# Patient Record
Sex: MALE | Race: WHITE | Employment: OTHER | URBAN - METROPOLITAN AREA
[De-identification: names, ages, dates, MRNs, and addresses within clinical notes are randomized per-mention and may not be internally consistent; named-entity substitution may affect disease eponyms.]

---

## 2017-01-26 RX ORDER — LISINOPRIL 10 MG/1
TABLET ORAL
Qty: 30 TABLET | Refills: 2 | Status: SHIPPED | OUTPATIENT
Start: 2017-01-26 | End: 2017-11-14

## 2017-01-26 RX ORDER — HYDROCHLOROTHIAZIDE 25 MG/1
TABLET ORAL
Qty: 30 TABLET | Refills: 2 | Status: SHIPPED | OUTPATIENT
Start: 2017-01-26 | End: 2017-11-14

## 2017-11-14 RX ORDER — HYDROCHLOROTHIAZIDE 25 MG/1
TABLET ORAL
Qty: 90 TABLET | Refills: 0 | Status: SHIPPED | OUTPATIENT
Start: 2017-11-14 | End: 2018-11-02 | Stop reason: ALTCHOICE

## 2017-11-14 RX ORDER — LISINOPRIL 10 MG/1
TABLET ORAL
Qty: 90 TABLET | Refills: 0 | Status: SHIPPED | OUTPATIENT
Start: 2017-11-14 | End: 2018-11-02 | Stop reason: ALTCHOICE

## 2017-11-14 NOTE — TELEPHONE ENCOUNTER
No upcoming appt     HYDROCHLOROTHIAZIDE 25MG TABLETS  Will file in chart as: HYDROCHLOROTHIAZIDE 25 MG Oral Tab  TAKE 1 TABLET(25 MG) BY MOUTH EVERY MORNING       Disp: 30 tablet Refills: 0    Class: Normal Start: 11/14/2017   Originally ordered: 1 year ago by Zbigniew Zuniga MD  Last refill: 1/26/2017  Hypertension Medications Protocol [id number]/14 9:47 AM   CMP or BMP in past 12 months    Appointment in past 6 or next 3 months    Last serum creatinine< 2.0   Protocol Details  LISINOPRIL 10MG TABLETS  Will file in chart as: LISINOPRIL 10 MG Oral Tab  TAKE 1 TABLET(10 MG) BY MOUTH DAILY       Disp: 30 tablet Refills: 0    Class: Normal Start: 11/14/2017   Originally ordered: 1 year ago by Zbigniew Zuniga MD  Last refill: 1/26/2017  Hypertension Medications Protocol Jhwbuf47/14 9:47 AM   CMP or BMP in past 12 months    Appointment in past 6 or next 3 months    Last serum creatinine< 2.0

## 2017-11-20 ENCOUNTER — OFFICE VISIT (OUTPATIENT)
Dept: FAMILY MEDICINE CLINIC | Facility: CLINIC | Age: 52
End: 2017-11-20

## 2017-11-20 VITALS
TEMPERATURE: 98 F | SYSTOLIC BLOOD PRESSURE: 158 MMHG | HEIGHT: 73 IN | HEART RATE: 76 BPM | BODY MASS INDEX: 32.74 KG/M2 | RESPIRATION RATE: 16 BRPM | OXYGEN SATURATION: 99 % | WEIGHT: 247 LBS | DIASTOLIC BLOOD PRESSURE: 90 MMHG

## 2017-11-20 DIAGNOSIS — I10 HYPERTENSION, UNCONTROLLED: Primary | ICD-10-CM

## 2017-11-20 DIAGNOSIS — R53.83 FATIGUE, UNSPECIFIED TYPE: ICD-10-CM

## 2017-11-20 DIAGNOSIS — F17.210 HEAVY CIGARETTE SMOKER (20-39 PER DAY): ICD-10-CM

## 2017-11-20 DIAGNOSIS — R07.89 ATYPICAL CHEST PAIN: ICD-10-CM

## 2017-11-20 DIAGNOSIS — F17.200 TOBACCO DEPENDENCE: ICD-10-CM

## 2017-11-20 DIAGNOSIS — Z23 NEEDS FLU SHOT: ICD-10-CM

## 2017-11-20 DIAGNOSIS — Z71.6 ENCOUNTER FOR SMOKING CESSATION COUNSELING: ICD-10-CM

## 2017-11-20 PROCEDURE — 99214 OFFICE O/P EST MOD 30 MIN: CPT | Performed by: EMERGENCY MEDICINE

## 2017-11-20 RX ORDER — LISINOPRIL 10 MG/1
TABLET ORAL
Qty: 90 TABLET | Refills: 0 | Status: CANCELLED | OUTPATIENT
Start: 2017-11-20

## 2017-11-20 RX ORDER — HYDROCHLOROTHIAZIDE 25 MG/1
TABLET ORAL
Qty: 90 TABLET | Refills: 0 | Status: CANCELLED | OUTPATIENT
Start: 2017-11-20

## 2017-11-20 NOTE — PATIENT INSTRUCTIONS
Thank you for choosing [facility]  To Do:  [address] Geraldine Soto  1. Have blood tests done after fasting  2. Have EKG done at [address]  3. Have Chest Xray done   4. Arrange for stress test  5. Start chantix  6. Need to quit smoking  7.  Daily blood pre information is not intended as a substitute for professional medical care. Always follow your healthcare professional's instructions. The Benefits of Living Smoke Free  What do you want to gain from quitting? Check off some reasons to quit.   Health So are smokeless (chewing) tobacco and snuff. All of these products contain nicotine, a highly addictive substance that has harmful effects on your body. Quitting smoking means giving up all tobacco products.      For more information  · https://smokefree. regular tobacco. Tobacco should not be used as a substitute for cigarettes. · Herbal medicines or teas may affect how your body handles nicotine. Talk to your healthcare provider before using these products.   · E-cigarettes have less toxins than the smoke are all good reasons to quit. How smoking affects your body  Smoking has been linked with many serious illnesses. It also has been shown to increase signs of aging. A few of the health effects of smoking are listed below.  Smoking can:  · Increase your r

## 2017-11-20 NOTE — PROGRESS NOTES
Chief Complaint:   Patient presents with:  Medication Follow-Up    HPI:   This is a 45year old male     HYPERTENSION  Requests refill. On Lisinopril and HCTZ. Admits to skipping meds on occasion.  Took both meds this AM.Has not been doing blood pressure re (Oral)   Resp 16   Ht 73\"   Wt 247 lb   SpO2 99%   BMI 32.59 kg/m²  Estimated body mass index is 32.59 kg/m² as calculated from the following:    Height as of this encounter: 73\". Weight as of this encounter: 247 lb. Vital signs reviewed. Appears sta for smoking cessation counseling  -     Varenicline Tartrate 0.5 MG X 11 & 1 MG X 42 Oral Misc; Use as directed    Needs flu shot    Other orders  -     Cancel: hydrochlorothiazide 25 MG Oral Tab; TAKE 1 TABLET(25 MG) BY MOUTH EVERY MORNING  -     Cancel:

## 2018-02-01 ENCOUNTER — HOSPITAL ENCOUNTER (EMERGENCY)
Age: 53
Discharge: HOME | End: 2018-02-01
Attending: STUDENT IN AN ORGANIZED HEALTH CARE EDUCATION/TRAINING PROGRAM

## 2018-02-01 ENCOUNTER — APPOINTMENT (OUTPATIENT)
Dept: CT IMAGING | Age: 53
End: 2018-02-01
Attending: STUDENT IN AN ORGANIZED HEALTH CARE EDUCATION/TRAINING PROGRAM

## 2018-02-01 VITALS
SYSTOLIC BLOOD PRESSURE: 144 MMHG | WEIGHT: 240 LBS | TEMPERATURE: 98 F | BODY MASS INDEX: 33.6 KG/M2 | OXYGEN SATURATION: 99 % | HEIGHT: 71 IN | DIASTOLIC BLOOD PRESSURE: 73 MMHG | HEART RATE: 66 BPM | RESPIRATION RATE: 18 BRPM

## 2018-02-01 DIAGNOSIS — R11.2 NAUSEA AND VOMITING IN ADULT: ICD-10-CM

## 2018-02-01 DIAGNOSIS — K80.50 BILIARY COLIC: ICD-10-CM

## 2018-02-01 DIAGNOSIS — N28.1 CYST OF RIGHT KIDNEY: ICD-10-CM

## 2018-02-01 DIAGNOSIS — R10.9 ABDOMINAL PAIN, ACUTE: Primary | ICD-10-CM

## 2018-02-01 LAB
ALBUMIN SERPL-MCNC: 3.9 G/DL (ref 3.5–4.8)
ALP LIVER SERPL-CCNC: 86 U/L (ref 45–117)
ALT SERPL-CCNC: 47 U/L (ref 17–63)
AST SERPL-CCNC: 30 U/L (ref 15–41)
BASOPHILS # BLD AUTO: 0.04 X10(3) UL (ref 0–0.1)
BASOPHILS NFR BLD AUTO: 0.5 %
BILIRUB SERPL-MCNC: 0.6 MG/DL (ref 0.1–2)
BUN BLD-MCNC: 14 MG/DL (ref 8–20)
CALCIUM BLD-MCNC: 8.8 MG/DL (ref 8.3–10.3)
CHLORIDE: 104 MMOL/L (ref 101–111)
CO2: 27 MMOL/L (ref 22–32)
CREAT BLD-MCNC: 0.83 MG/DL (ref 0.7–1.3)
EOSINOPHIL # BLD AUTO: 0.41 X10(3) UL (ref 0–0.3)
EOSINOPHIL NFR BLD AUTO: 4.7 %
ERYTHROCYTE [DISTWIDTH] IN BLOOD BY AUTOMATED COUNT: 12.4 % (ref 11.5–16)
GLUCOSE BLD-MCNC: 127 MG/DL (ref 70–99)
HCT VFR BLD AUTO: 47.1 % (ref 37–53)
HGB BLD-MCNC: 16.5 G/DL (ref 13–17)
IMMATURE GRANULOCYTE COUNT: 0.03 X10(3) UL (ref 0–1)
IMMATURE GRANULOCYTE RATIO %: 0.3 %
LIPASE: 160 U/L (ref 73–393)
LYMPHOCYTES # BLD AUTO: 1.67 X10(3) UL (ref 0.9–4)
LYMPHOCYTES NFR BLD AUTO: 19.2 %
M PROTEIN MFR SERPL ELPH: 7.6 G/DL (ref 6.1–8.3)
MCH RBC QN AUTO: 31.1 PG (ref 27–33.2)
MCHC RBC AUTO-ENTMCNC: 35 G/DL (ref 31–37)
MCV RBC AUTO: 88.7 FL (ref 80–99)
MONOCYTES # BLD AUTO: 0.87 X10(3) UL (ref 0.1–0.6)
MONOCYTES NFR BLD AUTO: 10 %
NEUTROPHIL ABS PRELIM: 5.67 X10 (3) UL (ref 1.3–6.7)
NEUTROPHILS # BLD AUTO: 5.67 X10(3) UL (ref 1.3–6.7)
NEUTROPHILS NFR BLD AUTO: 65.3 %
PLATELET # BLD AUTO: 159 10(3)UL (ref 150–450)
POTASSIUM SERPL-SCNC: 3.5 MMOL/L (ref 3.6–5.1)
RBC # BLD AUTO: 5.31 X10(6)UL (ref 4.3–5.7)
RED CELL DISTRIBUTION WIDTH-SD: 40.6 FL (ref 35.1–46.3)
SODIUM SERPL-SCNC: 138 MMOL/L (ref 136–144)
WBC # BLD AUTO: 8.7 X10(3) UL (ref 4–13)

## 2018-02-01 PROCEDURE — 99284 EMERGENCY DEPT VISIT MOD MDM: CPT

## 2018-02-01 PROCEDURE — 85025 COMPLETE CBC W/AUTO DIFF WBC: CPT | Performed by: STUDENT IN AN ORGANIZED HEALTH CARE EDUCATION/TRAINING PROGRAM

## 2018-02-01 PROCEDURE — 74177 CT ABD & PELVIS W/CONTRAST: CPT | Performed by: STUDENT IN AN ORGANIZED HEALTH CARE EDUCATION/TRAINING PROGRAM

## 2018-02-01 PROCEDURE — 96376 TX/PRO/DX INJ SAME DRUG ADON: CPT

## 2018-02-01 PROCEDURE — 96375 TX/PRO/DX INJ NEW DRUG ADDON: CPT

## 2018-02-01 PROCEDURE — 96361 HYDRATE IV INFUSION ADD-ON: CPT

## 2018-02-01 PROCEDURE — 96372 THER/PROPH/DIAG INJ SC/IM: CPT

## 2018-02-01 PROCEDURE — 83690 ASSAY OF LIPASE: CPT | Performed by: STUDENT IN AN ORGANIZED HEALTH CARE EDUCATION/TRAINING PROGRAM

## 2018-02-01 PROCEDURE — 96374 THER/PROPH/DIAG INJ IV PUSH: CPT

## 2018-02-01 PROCEDURE — 80053 COMPREHEN METABOLIC PANEL: CPT | Performed by: STUDENT IN AN ORGANIZED HEALTH CARE EDUCATION/TRAINING PROGRAM

## 2018-02-01 RX ORDER — HYDROCODONE BITARTRATE AND ACETAMINOPHEN 5; 325 MG/1; MG/1
1-2 TABLET ORAL EVERY 4 HOURS PRN
Qty: 20 TABLET | Refills: 0 | Status: SHIPPED | OUTPATIENT
Start: 2018-02-01 | End: 2018-02-08

## 2018-02-01 RX ORDER — KETOROLAC TROMETHAMINE 30 MG/ML
INJECTION, SOLUTION INTRAMUSCULAR; INTRAVENOUS
Status: COMPLETED
Start: 2018-02-01 | End: 2018-02-01

## 2018-02-01 RX ORDER — DICYCLOMINE HYDROCHLORIDE 10 MG/ML
10 INJECTION INTRAMUSCULAR ONCE
Status: COMPLETED | OUTPATIENT
Start: 2018-02-01 | End: 2018-02-01

## 2018-02-01 RX ORDER — ONDANSETRON 2 MG/ML
4 INJECTION INTRAMUSCULAR; INTRAVENOUS ONCE
Status: COMPLETED | OUTPATIENT
Start: 2018-02-01 | End: 2018-02-01

## 2018-02-01 RX ORDER — ONDANSETRON 4 MG/1
4 TABLET, ORALLY DISINTEGRATING ORAL EVERY 4 HOURS PRN
Qty: 10 TABLET | Refills: 0 | Status: SHIPPED | OUTPATIENT
Start: 2018-02-01 | End: 2018-02-08

## 2018-02-01 RX ORDER — KETOROLAC TROMETHAMINE 30 MG/ML
30 INJECTION, SOLUTION INTRAMUSCULAR; INTRAVENOUS ONCE
Status: DISCONTINUED | OUTPATIENT
Start: 2018-02-01 | End: 2018-02-01

## 2018-02-01 RX ORDER — DICYCLOMINE HCL 20 MG
20 TABLET ORAL 4 TIMES DAILY PRN
Qty: 30 TABLET | Refills: 0 | Status: SHIPPED | OUTPATIENT
Start: 2018-02-01 | End: 2018-03-03

## 2018-02-01 RX ORDER — HYDROMORPHONE HYDROCHLORIDE 1 MG/ML
0.5 INJECTION, SOLUTION INTRAMUSCULAR; INTRAVENOUS; SUBCUTANEOUS EVERY 5 MIN PRN
Status: DISCONTINUED | OUTPATIENT
Start: 2018-02-01 | End: 2018-02-01

## 2018-02-01 NOTE — ED PROVIDER NOTES
Patient Seen in: [facility] In [place]    History   Patient presents with:  Nausea/Vomiting/Diarrhea (gastrointestinal)    Stated Complaint: abdomen pain since 9 pm last night nv    HPI    Patient is a 62-year-old male who presents th normal heart sounds and intact distal pulses. Exam reveals no gallop and no friction rub. No murmur heard. Pulmonary/Chest: Effort normal. No respiratory distress. no wheezes. no rales. no tenderness. Abdominal: Soft.  Bowel sounds are normal, no di received Toradol and Bentyl. He is improved and would like to go home. Vomiting resolved with antiemetics. He also received IV fluids. CT abdomen pelvis was obtained. Findings consistent with gallstone were noted. No evidence of cholecystitis.   On

## 2018-11-01 ENCOUNTER — TELEPHONE (OUTPATIENT)
Dept: FAMILY MEDICINE CLINIC | Facility: CLINIC | Age: 53
End: 2018-11-01

## 2018-11-01 NOTE — TELEPHONE ENCOUNTER
Left patient a message. He has not been in the office since 2017 and his BP was high.  If he is still coming to this office he is going to need to schedule a office visit if he is not seeing this office then we need to know who his new PCP is so we can take

## 2018-11-02 ENCOUNTER — OFFICE VISIT (OUTPATIENT)
Dept: FAMILY MEDICINE CLINIC | Facility: CLINIC | Age: 53
End: 2018-11-02

## 2018-11-02 VITALS
BODY MASS INDEX: 30.88 KG/M2 | WEIGHT: 233 LBS | TEMPERATURE: 98 F | OXYGEN SATURATION: 98 % | RESPIRATION RATE: 16 BRPM | HEART RATE: 87 BPM | SYSTOLIC BLOOD PRESSURE: 130 MMHG | DIASTOLIC BLOOD PRESSURE: 80 MMHG | HEIGHT: 73 IN

## 2018-11-02 DIAGNOSIS — R13.10 DYSPHAGIA, UNSPECIFIED TYPE: ICD-10-CM

## 2018-11-02 DIAGNOSIS — R16.1 SPLENOMEGALY: ICD-10-CM

## 2018-11-02 DIAGNOSIS — N28.1 KIDNEY CYSTS: ICD-10-CM

## 2018-11-02 DIAGNOSIS — M54.50 ACUTE LEFT-SIDED LOW BACK PAIN WITHOUT SCIATICA: Primary | ICD-10-CM

## 2018-11-02 DIAGNOSIS — K80.20 CHOLELITHIASIS WITHOUT CHOLECYSTITIS: ICD-10-CM

## 2018-11-02 PROCEDURE — 81003 URINALYSIS AUTO W/O SCOPE: CPT | Performed by: NURSE PRACTITIONER

## 2018-11-02 PROCEDURE — 99213 OFFICE O/P EST LOW 20 MIN: CPT | Performed by: NURSE PRACTITIONER

## 2018-11-02 PROCEDURE — 96372 THER/PROPH/DIAG INJ SC/IM: CPT | Performed by: NURSE PRACTITIONER

## 2018-11-02 RX ORDER — DICLOFENAC SODIUM 75 MG/1
75 TABLET, DELAYED RELEASE ORAL 2 TIMES DAILY
Qty: 60 TABLET | Refills: 0 | Status: SHIPPED | OUTPATIENT
Start: 2018-11-02 | End: 2018-12-02

## 2018-11-02 RX ORDER — METHYLPREDNISOLONE 4 MG/1
TABLET ORAL
Qty: 1 KIT | Refills: 0 | Status: SHIPPED | OUTPATIENT
Start: 2018-11-02 | End: 2018-12-07

## 2018-11-02 RX ORDER — KETOROLAC TROMETHAMINE 30 MG/ML
15 INJECTION, SOLUTION INTRAMUSCULAR; INTRAVENOUS ONCE
Status: COMPLETED | OUTPATIENT
Start: 2018-11-02 | End: 2018-11-02

## 2018-11-02 RX ORDER — CYCLOBENZAPRINE HCL 10 MG
5 TABLET ORAL NIGHTLY
Qty: 30 TABLET | Refills: 0 | Status: SHIPPED | OUTPATIENT
Start: 2018-11-02 | End: 2018-12-02

## 2018-11-02 RX ADMIN — KETOROLAC TROMETHAMINE 15 MG: 30 INJECTION, SOLUTION INTRAMUSCULAR; INTRAVENOUS at 08:35:00

## 2018-11-02 NOTE — PROGRESS NOTES
Patient presents with:  Back Pain: pulled muscle 3 days ago        HPI:  back has been hurting for 3 days . Woke up in the morning and have been worsening . Sharp , stabbing , comes and goes , better when  standing. Worse when bending.  Rates pain at  5-6/ or back. Back:  Normal on inspection, no pain on palpation of the spinal and paraspinal muscles.  Pain with ROM Lumbar spine   Neuro:  Reflexes at knees Left  1+  and R 2+       A/P  Diagnoses and all orders for this visit:    Acute left-sided low back dory

## 2018-11-14 ENCOUNTER — MED REC SCAN ONLY (OUTPATIENT)
Dept: FAMILY MEDICINE CLINIC | Facility: CLINIC | Age: 53
End: 2018-11-14

## 2018-11-29 ENCOUNTER — TELEPHONE (OUTPATIENT)
Dept: FAMILY MEDICINE CLINIC | Facility: CLINIC | Age: 53
End: 2018-11-29

## 2018-11-29 NOTE — TELEPHONE ENCOUNTER
Patient called requesting test results that were completed this month. He does not know what date exactly. He said he was looking for lab and US results. Please call patient at [phone].  Patient said ok to leave a detailed message on his cell if he colón

## 2018-11-29 NOTE — TELEPHONE ENCOUNTER
Patient has lab results from [facility] scanned in to system but no ultrasound. I called [facility] and spoke with Yancy Gutierres. She is faxing over 2 ultrasound reports.

## 2018-11-30 NOTE — TELEPHONE ENCOUNTER
Reports never received. Spoke with Catrachita García at [employer] [facility]. She will re-fax reports. Reports received.

## 2018-11-30 NOTE — TELEPHONE ENCOUNTER
Patient called requesting lab results from blood work and ultrasound he had done earlier this month. The labs are in the system. I got the [address] [facility] [address] reports faxed from [facility]. Please advise. LOV 11/2. Thanks. US reports given to Dayanara.

## 2018-12-03 NOTE — TELEPHONE ENCOUNTER
Reviewed result report .  US abdomen showing fatty liver and gallstones , and Kidney US solid nodule R kidney -size did no changed from CT scan from 2/1/2018 but recommend further assessment with  Contrast MRI - can talk to [facility] and find out how much would c

## 2018-12-04 ENCOUNTER — MED REC SCAN ONLY (OUTPATIENT)
Dept: FAMILY MEDICINE CLINIC | Facility: CLINIC | Age: 53
End: 2018-12-04

## 2018-12-07 ENCOUNTER — OFFICE VISIT (OUTPATIENT)
Dept: FAMILY MEDICINE CLINIC | Facility: CLINIC | Age: 53
End: 2018-12-07

## 2018-12-07 VITALS
RESPIRATION RATE: 18 BRPM | OXYGEN SATURATION: 97 % | HEART RATE: 69 BPM | WEIGHT: 232 LBS | DIASTOLIC BLOOD PRESSURE: 94 MMHG | HEIGHT: 73 IN | TEMPERATURE: 98 F | SYSTOLIC BLOOD PRESSURE: 172 MMHG | BODY MASS INDEX: 30.75 KG/M2

## 2018-12-07 DIAGNOSIS — E78.00 HYPERCHOLESTEROLEMIA: ICD-10-CM

## 2018-12-07 DIAGNOSIS — I10 HYPERTENSION, UNCONTROLLED: ICD-10-CM

## 2018-12-07 DIAGNOSIS — K80.20 CALCULUS OF GALLBLADDER WITHOUT CHOLECYSTITIS WITHOUT OBSTRUCTION: ICD-10-CM

## 2018-12-07 DIAGNOSIS — R10.13 EPIGASTRIC PAIN: Primary | ICD-10-CM

## 2018-12-07 DIAGNOSIS — N28.89 RENAL MASS: ICD-10-CM

## 2018-12-07 PROCEDURE — 99214 OFFICE O/P EST MOD 30 MIN: CPT | Performed by: EMERGENCY MEDICINE

## 2018-12-07 RX ORDER — LISINOPRIL 10 MG/1
TABLET ORAL
Qty: 90 TABLET | Refills: 0 | Status: SHIPPED | OUTPATIENT
Start: 2018-12-07 | End: 2019-04-19

## 2018-12-07 RX ORDER — ATORVASTATIN CALCIUM 20 MG/1
20 TABLET, FILM COATED ORAL NIGHTLY
Qty: 90 TABLET | Refills: 0 | Status: SHIPPED | OUTPATIENT
Start: 2018-12-07 | End: 2019-07-16

## 2018-12-07 RX ORDER — HYDROCHLOROTHIAZIDE 25 MG/1
TABLET ORAL
Qty: 90 TABLET | Refills: 0 | Status: SHIPPED | OUTPATIENT
Start: 2018-12-07 | End: 2019-04-19

## 2018-12-07 NOTE — PATIENT INSTRUCTIONS
Thank you for choosing [facility]  To Do:  FOR Ramon Hernandez regularly and avoid prolonged fasting  Avoid aspirin or Ibuprofen or aleve or naproxen  Rest  Take over the counter PREVACID daily for 6-8 weeks  May also take over chewable one. You may find one works better than another for you. Overuse can cause diarrhea or constipation. · Acid blockers (H2 blockers) decrease acid production. Examples are cimetidine, famotidine, and ranitidine.   · Acid inhibitors (PPIs) decrease a or if it worsens or spreads to the chest, back, neck, shoulder, or arm  · Frequent vomiting (can’t keep down liquids)  · Blood in the stool or vomit (red or black color)  · Feeling weak or dizzy, fainting, or having trouble breathing  · Fever of 100.4ºF (3

## 2018-12-07 NOTE — PROGRESS NOTES
Chief Complaint:   Patient presents with: Follow - Up: labs    HPI:   This is a 46year old male       HYPERTENSION  ON Lisinopril and HCTZ. Stopped BP meds. No current meds. Has been lost to follow up. No CP or SOB.        ABDOMINAL PAIN  Pain for t from the following:    Height as of this encounter: 73\". Weight as of this encounter: 232 lb. Vital signs reviewed. Appears stated age, well groomed.   GENERAL: well developed, well nourished, well hydrated, no distress  SKIN: good skin turgor, no obvi

## 2018-12-10 PROBLEM — N28.89 RENAL MASS: Status: ACTIVE | Noted: 2018-12-10

## 2018-12-10 PROBLEM — K80.20 CALCULUS OF GALLBLADDER WITHOUT CHOLECYSTITIS WITHOUT OBSTRUCTION: Status: ACTIVE | Noted: 2018-12-10

## 2018-12-10 PROBLEM — E78.00 HYPERCHOLESTEROLEMIA: Status: ACTIVE | Noted: 2018-12-10

## 2018-12-13 NOTE — TELEPHONE ENCOUNTER
Pt was seen 12/7/18. Were Kidney and Liver ultrasound results discussed during the office visit? Please advise.  Thank you

## 2018-12-18 NOTE — TELEPHONE ENCOUNTER
Yes, patient advised to follow up with urology for adrenal or renal mass  Also referred to surgery for cholelithiasis

## 2019-02-05 ENCOUNTER — PATIENT OUTREACH (OUTPATIENT)
Dept: FAMILY MEDICINE CLINIC | Facility: CLINIC | Age: 54
End: 2019-02-05

## 2019-03-26 ENCOUNTER — APPOINTMENT (OUTPATIENT)
Dept: MRI IMAGING | Facility: HOSPITAL | Age: 54
End: 2019-03-26
Attending: HOSPITALIST
Payer: MEDICAID

## 2019-03-26 ENCOUNTER — HOSPITAL ENCOUNTER (OUTPATIENT)
Facility: HOSPITAL | Age: 54
Setting detail: OBSERVATION
Discharge: HOME | End: 2019-03-27
Attending: EMERGENCY MEDICINE | Admitting: HOSPITALIST
Payer: MEDICAID

## 2019-03-26 ENCOUNTER — APPOINTMENT (OUTPATIENT)
Dept: CT IMAGING | Age: 54
End: 2019-03-26
Attending: EMERGENCY MEDICINE
Payer: MEDICAID

## 2019-03-26 ENCOUNTER — APPOINTMENT (OUTPATIENT)
Dept: GENERAL RADIOLOGY | Age: 54
End: 2019-03-26
Attending: EMERGENCY MEDICINE
Payer: MEDICAID

## 2019-03-26 ENCOUNTER — APPOINTMENT (OUTPATIENT)
Dept: CV DIAGNOSTICS | Facility: HOSPITAL | Age: 54
End: 2019-03-26
Attending: HOSPITALIST
Payer: MEDICAID

## 2019-03-26 DIAGNOSIS — R55 SYNCOPE AND COLLAPSE: Primary | ICD-10-CM

## 2019-03-26 PROBLEM — D72.829 LEUKOCYTOSIS: Status: ACTIVE | Noted: 2019-03-26

## 2019-03-26 LAB
ALBUMIN SERPL-MCNC: 3.9 G/DL (ref 3.4–5)
ALBUMIN/GLOB SERPL: 1.1 {RATIO} (ref 1–2)
ALP LIVER SERPL-CCNC: 84 U/L (ref 45–117)
ALT SERPL-CCNC: 44 U/L (ref 16–61)
ANION GAP SERPL CALC-SCNC: 6 MMOL/L (ref 0–18)
AST SERPL-CCNC: 22 U/L (ref 15–37)
ATRIAL RATE: 69 BPM
BASOPHILS # BLD AUTO: 0.04 X10(3) UL (ref 0–0.2)
BASOPHILS NFR BLD AUTO: 0.3 %
BILIRUB SERPL-MCNC: 0.6 MG/DL (ref 0.1–2)
BILIRUB UR QL STRIP.AUTO: NEGATIVE
BUN BLD-MCNC: 12 MG/DL (ref 7–18)
BUN/CREAT SERPL: 16.4 (ref 10–20)
CALCIUM BLD-MCNC: 8.7 MG/DL (ref 8.5–10.1)
CHLORIDE SERPL-SCNC: 109 MMOL/L (ref 98–107)
CLARITY UR REFRACT.AUTO: CLEAR
CO2 SERPL-SCNC: 25 MMOL/L (ref 21–32)
COLOR UR AUTO: YELLOW
CREAT BLD-MCNC: 0.73 MG/DL (ref 0.7–1.3)
D-DIMER: <0.27 UG/ML FEU (ref 0–0.49)
DEPRECATED RDW RBC AUTO: 43 FL (ref 35.1–46.3)
EOSINOPHIL # BLD AUTO: 0.39 X10(3) UL (ref 0–0.7)
EOSINOPHIL NFR BLD AUTO: 2.5 %
ERYTHROCYTE [DISTWIDTH] IN BLOOD BY AUTOMATED COUNT: 13.1 % (ref 11–15)
EST. AVERAGE GLUCOSE BLD GHB EST-MCNC: 97 MG/DL (ref 68–126)
GLOBULIN PLAS-MCNC: 3.7 G/DL (ref 2.8–4.4)
GLUCOSE BLD-MCNC: 100 MG/DL (ref 70–99)
GLUCOSE BLD-MCNC: 95 MG/DL (ref 70–99)
GLUCOSE UR STRIP.AUTO-MCNC: NEGATIVE MG/DL
HBA1C MFR BLD HPLC: 5 % (ref ?–5.7)
HCT VFR BLD AUTO: 49.6 % (ref 39–53)
HGB BLD-MCNC: 17.1 G/DL (ref 13–17.5)
IMM GRANULOCYTES # BLD AUTO: 0.07 X10(3) UL (ref 0–1)
IMM GRANULOCYTES NFR BLD: 0.5 %
KETONES UR STRIP.AUTO-MCNC: NEGATIVE MG/DL
LEUKOCYTE ESTERASE UR QL STRIP.AUTO: NEGATIVE
LYMPHOCYTES # BLD AUTO: 2 X10(3) UL (ref 1–4)
LYMPHOCYTES NFR BLD AUTO: 13 %
M PROTEIN MFR SERPL ELPH: 7.6 G/DL (ref 6.4–8.2)
MCH RBC QN AUTO: 31 PG (ref 26–34)
MCHC RBC AUTO-ENTMCNC: 34.5 G/DL (ref 31–37)
MCV RBC AUTO: 90 FL (ref 80–100)
MONOCYTES # BLD AUTO: 1.04 X10(3) UL (ref 0.1–1)
MONOCYTES NFR BLD AUTO: 6.8 %
NEUTROPHILS # BLD AUTO: 11.86 X10 (3) UL (ref 1.5–7.7)
NEUTROPHILS # BLD AUTO: 11.86 X10(3) UL (ref 1.5–7.7)
NEUTROPHILS NFR BLD AUTO: 76.9 %
NITRITE UR QL STRIP.AUTO: NEGATIVE
OSMOLALITY SERPL CALC.SUM OF ELEC: 290 MOSM/KG (ref 275–295)
P AXIS: 40 DEGREES
P-R INTERVAL: 168 MS
PH UR STRIP.AUTO: 6 [PH] (ref 4.5–8)
PLATELET # BLD AUTO: 150 10(3)UL (ref 150–450)
POTASSIUM SERPL-SCNC: 3.9 MMOL/L (ref 3.5–5.1)
PROT UR STRIP.AUTO-MCNC: NEGATIVE MG/DL
Q-T INTERVAL: 376 MS
QRS DURATION: 104 MS
QTC CALCULATION (BEZET): 402 MS
R AXIS: 26 DEGREES
RBC # BLD AUTO: 5.51 X10(6)UL (ref 4.3–5.7)
RBC UR QL AUTO: NEGATIVE
SODIUM SERPL-SCNC: 140 MMOL/L (ref 136–145)
SP GR UR STRIP.AUTO: 1.01 (ref 1–1.03)
T AXIS: 30 DEGREES
TROPONIN I SERPL-MCNC: <0.045 NG/ML (ref ?–0.04)
TROPONIN I SERPL-MCNC: <0.045 NG/ML (ref ?–0.04)
UROBILINOGEN UR STRIP.AUTO-MCNC: <2 MG/DL
VENTRICULAR RATE: 69 BPM
WBC # BLD AUTO: 15.4 X10(3) UL (ref 4–11)

## 2019-03-26 PROCEDURE — 93306 TTE W/DOPPLER COMPLETE: CPT | Performed by: HOSPITALIST

## 2019-03-26 PROCEDURE — 70553 MRI BRAIN STEM W/O & W/DYE: CPT | Performed by: HOSPITALIST

## 2019-03-26 PROCEDURE — 70450 CT HEAD/BRAIN W/O DYE: CPT | Performed by: EMERGENCY MEDICINE

## 2019-03-26 PROCEDURE — 99220 INITIAL OBSERVATION CARE,LEVL III: CPT | Performed by: HOSPITALIST

## 2019-03-26 PROCEDURE — 71045 X-RAY EXAM CHEST 1 VIEW: CPT | Performed by: EMERGENCY MEDICINE

## 2019-03-26 RX ORDER — SODIUM CHLORIDE 9 MG/ML
INJECTION, SOLUTION INTRAVENOUS CONTINUOUS
Status: DISCONTINUED | OUTPATIENT
Start: 2019-03-26 | End: 2019-03-26

## 2019-03-26 RX ORDER — ONDANSETRON 2 MG/ML
4 INJECTION INTRAMUSCULAR; INTRAVENOUS EVERY 4 HOURS PRN
Status: DISCONTINUED | OUTPATIENT
Start: 2019-03-26 | End: 2019-03-27

## 2019-03-26 RX ORDER — NICOTINE 21 MG/24HR
1 PATCH, TRANSDERMAL 24 HOURS TRANSDERMAL DAILY
Status: DISCONTINUED | OUTPATIENT
Start: 2019-03-26 | End: 2019-03-27

## 2019-03-26 RX ORDER — ACETAMINOPHEN 325 MG/1
650 TABLET ORAL EVERY 6 HOURS PRN
Status: DISCONTINUED | OUTPATIENT
Start: 2019-03-26 | End: 2019-03-27

## 2019-03-26 RX ORDER — SODIUM CHLORIDE 9 MG/ML
INJECTION, SOLUTION INTRAVENOUS CONTINUOUS
Status: DISCONTINUED | OUTPATIENT
Start: 2019-03-26 | End: 2019-03-27

## 2019-03-26 RX ORDER — ATORVASTATIN CALCIUM 10 MG/1
20 TABLET, FILM COATED ORAL NIGHTLY
Status: DISCONTINUED | OUTPATIENT
Start: 2019-03-26 | End: 2019-03-27

## 2019-03-26 NOTE — H&P
[facility]  History and Physical     Juan Nielsen Patient Status:  Observation    12/10/1965 MRN [id number]   [facility] 0SW-A Attending Pepe Gonzales MD   Hosp Day # 0 PCP Dale Mesa MD     Chief Complaint: Syncope and negatives noted in the HPI. Physical Exam:    /76   Pulse 66   Temp 99 °F (37.2 °C) (Oral)   Resp 20   Ht 6' (1.829 m)   Wt 235 lb (106.6 kg)   SpO2 98%   BMI 31.87 kg/m²   General: No acute distress. Alert and oriented x 3.   HEENT: Normocepha

## 2019-03-26 NOTE — PLAN OF CARE
Full neuro exam completed. NIH 0. Patient feels anxious and thinks that he feels \"foggy\" and states it is \"very concerning\" to him. VSS. Paged Dr. Mitchell Gordon. Orders received. At [phone], paged Dr. Mag Biggs regarding new Neuro consult.  She responded and will

## 2019-03-26 NOTE — ED PROVIDER NOTES
Patient Seen in: [facility] [place]    History   Patient presents with:  Syncope (cardiovascular, neurologic)    Stated Complaint: syncope     HPI  This is a 13-year-old male who arrives here with complaints of a syncopal episode. BP 03/26/19 1051 155/81   Pulse 03/26/19 1051 73   Resp 03/26/19 1051 16   Temp 03/26/19 1051 99.1 °F (37.3 °C)   Temp src 03/26/19 1051 Temporal   SpO2 03/26/19 1051 98 %   O2 Device 03/26/19 1045 None (Room air)       Current:/81   Pulse 73   Tem Units.    D-Dimer results of less than 0.5 ug/mL (FEU) have been shown to contribute to the exclusion of venous thromboembolism with a negative predictive value of approximately 95% when results are used as part of the total clinical evaluation of the eliezer extraaxial fluid collections. There is no midline shift. There are no intraparenchymal brain abnormalities. There is nothing specific for acute infarct. There is no hemorrhage or mass lesion. SINUSES:           No sign of acute sinusitis.   MASTOIDS: Clinical Impression:  Syncope and collapse  (primary encounter diagnosis)    Disposition:  Admit  3/26/2019 12:03 pm    Follow-up:  No follow-up provider specified.       Medications Prescribed:  Current Discharge Medication List        Present on Admis

## 2019-03-26 NOTE — DIETARY NOTE
[facility]     Admitting diagnosis:  Syncope and collapse [R55]    Ht: 182.9 cm (6')  Wt: 106.6 kg (235 lb). This is 132 % of IBW  BMI: Body mass index is 31.87 kg/m².   IBW: Ideal body weight: 80.9 kg (178 lb)

## 2019-03-27 VITALS
BODY MASS INDEX: 31.83 KG/M2 | WEIGHT: 235 LBS | HEIGHT: 72 IN | TEMPERATURE: 98 F | RESPIRATION RATE: 18 BRPM | SYSTOLIC BLOOD PRESSURE: 135 MMHG | DIASTOLIC BLOOD PRESSURE: 78 MMHG | OXYGEN SATURATION: 97 % | HEART RATE: 60 BPM

## 2019-03-27 LAB
ALBUMIN SERPL-MCNC: 3.2 G/DL (ref 3.4–5)
ALBUMIN/GLOB SERPL: 1 {RATIO} (ref 1–2)
ALP LIVER SERPL-CCNC: 73 U/L (ref 45–117)
ALT SERPL-CCNC: 38 U/L (ref 16–61)
ANION GAP SERPL CALC-SCNC: 5 MMOL/L (ref 0–18)
AST SERPL-CCNC: 22 U/L (ref 15–37)
BASOPHILS # BLD AUTO: 0.03 X10(3) UL (ref 0–0.2)
BASOPHILS NFR BLD AUTO: 0.4 %
BILIRUB SERPL-MCNC: 0.5 MG/DL (ref 0.1–2)
BUN BLD-MCNC: 11 MG/DL (ref 7–18)
BUN/CREAT SERPL: 14.9 (ref 10–20)
CALCIUM BLD-MCNC: 8 MG/DL (ref 8.5–10.1)
CHLORIDE SERPL-SCNC: 110 MMOL/L (ref 98–107)
CHOLEST SMN-MCNC: 178 MG/DL (ref ?–200)
CO2 SERPL-SCNC: 27 MMOL/L (ref 21–32)
CREAT BLD-MCNC: 0.74 MG/DL (ref 0.7–1.3)
DEPRECATED RDW RBC AUTO: 42.8 FL (ref 35.1–46.3)
EOSINOPHIL # BLD AUTO: 0.53 X10(3) UL (ref 0–0.7)
EOSINOPHIL NFR BLD AUTO: 7.6 %
ERYTHROCYTE [DISTWIDTH] IN BLOOD BY AUTOMATED COUNT: 12.9 % (ref 11–15)
GLOBULIN PLAS-MCNC: 3.3 G/DL (ref 2.8–4.4)
GLUCOSE BLD-MCNC: 129 MG/DL (ref 70–99)
HCT VFR BLD AUTO: 43.2 % (ref 39–53)
HDLC SERPL-MCNC: 35 MG/DL (ref 40–59)
HGB BLD-MCNC: 15.1 G/DL (ref 13–17.5)
IMM GRANULOCYTES # BLD AUTO: 0.03 X10(3) UL (ref 0–1)
IMM GRANULOCYTES NFR BLD: 0.4 %
LDLC SERPL CALC-MCNC: 119 MG/DL (ref ?–100)
LYMPHOCYTES # BLD AUTO: 2.38 X10(3) UL (ref 1–4)
LYMPHOCYTES NFR BLD AUTO: 34.3 %
M PROTEIN MFR SERPL ELPH: 6.5 G/DL (ref 6.4–8.2)
MCH RBC QN AUTO: 31.7 PG (ref 26–34)
MCHC RBC AUTO-ENTMCNC: 35 G/DL (ref 31–37)
MCV RBC AUTO: 90.8 FL (ref 80–100)
MONOCYTES # BLD AUTO: 0.54 X10(3) UL (ref 0.1–1)
MONOCYTES NFR BLD AUTO: 7.8 %
NEUTROPHILS # BLD AUTO: 3.43 X10 (3) UL (ref 1.5–7.7)
NEUTROPHILS # BLD AUTO: 3.43 X10(3) UL (ref 1.5–7.7)
NEUTROPHILS NFR BLD AUTO: 49.5 %
NONHDLC SERPL-MCNC: 143 MG/DL (ref ?–130)
OSMOLALITY SERPL CALC.SUM OF ELEC: 295 MOSM/KG (ref 275–295)
PLATELET # BLD AUTO: 141 10(3)UL (ref 150–450)
POTASSIUM SERPL-SCNC: 3.6 MMOL/L (ref 3.5–5.1)
RBC # BLD AUTO: 4.76 X10(6)UL (ref 4.3–5.7)
SODIUM SERPL-SCNC: 142 MMOL/L (ref 136–145)
TRIGL SERPL-MCNC: 119 MG/DL (ref 30–149)
TSI SER-ACNC: 1.2 MIU/ML (ref 0.36–3.74)
VLDLC SERPL CALC-MCNC: 24 MG/DL (ref 0–30)
WBC # BLD AUTO: 6.9 X10(3) UL (ref 4–11)

## 2019-03-27 PROCEDURE — 99244 OFF/OP CNSLTJ NEW/EST MOD 40: CPT | Performed by: OTHER

## 2019-03-27 PROCEDURE — 99217 OBSERVATION CARE DISCHARGE: CPT | Performed by: HOSPITALIST

## 2019-03-27 RX ORDER — POTASSIUM CHLORIDE 20 MEQ/1
40 TABLET, EXTENDED RELEASE ORAL EVERY 4 HOURS
Status: DISCONTINUED | OUTPATIENT
Start: 2019-03-27 | End: 2019-03-27

## 2019-03-27 RX ORDER — NICOTINE 21 MG/24HR
1 PATCH, TRANSDERMAL 24 HOURS TRANSDERMAL DAILY
Qty: 30 PATCH | Refills: 0 | Status: SHIPPED | OUTPATIENT
Start: 2019-03-28 | End: 2020-06-19 | Stop reason: ALTCHOICE

## 2019-03-27 NOTE — PLAN OF CARE
Impaired Cognition    • Patient will exhibit improved attention, thought processing and/or memory Progressing        MUSCULOSKELETAL - ADULT    • Return mobility to safest level of function Progressing        NEUROLOGICAL - ADULT    • Achieves stable or im

## 2019-03-27 NOTE — CONSULTS
[facility]    Report of Consultation    Jono Nuñez Patient Status:  Observation    12/10/1965 MRN [id number]   [facility] 0SW-A Attending Alexia Curling, MD   Hosp Day # 0 PCP Katherine Kirby MD     Date of Admission:  3/26/20 acetaminophen (TYLENOL) tab 650 mg, 650 mg, Oral, Q6H PRN  •  atorvastatin (LIPITOR) tab 20 mg, 20 mg, Oral, Nightly  •  nicotine (NICODERM CQ) 21 MG/24HR 1 patch, 1 patch, Transdermal, Daily    Review of Systems:  A 10-point system was reviewed.   Sveta Sheldon effect. Assessment  Patient Active Problem List:     Tobacco dependence     Hypertension, uncontrolled     Heavy cigarette smoker (20-39 per day)     Obesity (BMI 30-39. 9)     Renal mass     Hypercholesterolemia     Calculus of gallbladder withou

## 2019-03-27 NOTE — PLAN OF CARE
MUSCULOSKELETAL - ADULT    • Return mobility to safest level of function Progressing        NEUROLOGICAL - ADULT    • Achieves stable or improved neurological status Progressing        SKIN/TISSUE INTEGRITY - ADULT    • Skin integrity remains intact Progre

## 2019-03-27 NOTE — PROGRESS NOTES
NURSING DISCHARGE NOTE    Discharged Home via Wheelchair. Accompanied by Spouse  Belongings Taken by patient/family.     Discharge instructions reviewed and patient voiced understanding

## 2019-03-27 NOTE — DISCHARGE SUMMARY
[facility]  DISCHARGE SUMMARY     Scottarthur Guzman Patient Status:  Observation    12/10/1965 MRN [id number]   [facility] 0SW-A Attending Owen Salcido MD   Hosp Day # 0 PCP Edgardo Lang MD     Date of Admission: 3/26/2019  Da known as:  HYDRODIURIL      TAKE 1 TABLET(25 MG) BY MOUTH EVERY MORNING   Quantity:  90 tablet  Refills:  0     lisinopril 10 MG Tabs  Commonly known as:  PRINIVIL,ZESTRIL      TAKE 1 TABLET(10 MG) BY MOUTH DAILY   Quantity:  90 tablet  Refills:  0

## 2019-04-19 DIAGNOSIS — I10 HYPERTENSION, UNCONTROLLED: ICD-10-CM

## 2019-04-22 ENCOUNTER — OFFICE VISIT (OUTPATIENT)
Dept: FAMILY MEDICINE CLINIC | Facility: CLINIC | Age: 54
End: 2019-04-22

## 2019-04-22 VITALS
WEIGHT: 231 LBS | BODY MASS INDEX: 31 KG/M2 | SYSTOLIC BLOOD PRESSURE: 150 MMHG | DIASTOLIC BLOOD PRESSURE: 92 MMHG | OXYGEN SATURATION: 97 % | RESPIRATION RATE: 18 BRPM | HEART RATE: 70 BPM

## 2019-04-22 DIAGNOSIS — I10 HYPERTENSION, UNCONTROLLED: Primary | ICD-10-CM

## 2019-04-22 DIAGNOSIS — F17.200 TOBACCO DEPENDENCE: ICD-10-CM

## 2019-04-22 DIAGNOSIS — E78.00 HYPERCHOLESTEROLEMIA: ICD-10-CM

## 2019-04-22 DIAGNOSIS — N28.89 RENAL MASS: ICD-10-CM

## 2019-04-22 PROCEDURE — 99214 OFFICE O/P EST MOD 30 MIN: CPT | Performed by: EMERGENCY MEDICINE

## 2019-04-22 RX ORDER — HYDROCHLOROTHIAZIDE 25 MG/1
TABLET ORAL
Qty: 90 TABLET | Refills: 0 | Status: SHIPPED | OUTPATIENT
Start: 2019-04-22 | End: 2019-07-31

## 2019-04-22 RX ORDER — LISINOPRIL 20 MG/1
TABLET ORAL
Qty: 30 TABLET | Refills: 0 | Status: SHIPPED | OUTPATIENT
Start: 2019-04-22 | End: 2019-05-22

## 2019-04-22 RX ORDER — LISINOPRIL 10 MG/1
TABLET ORAL
Qty: 90 TABLET | Refills: 0 | Status: SHIPPED | OUTPATIENT
Start: 2019-04-22 | End: 2019-04-22

## 2019-04-22 RX ORDER — LISINOPRIL 20 MG/1
TABLET ORAL
Qty: 30 TABLET | Refills: 0 | Status: SHIPPED | OUTPATIENT
Start: 2019-04-22 | End: 2019-04-22

## 2019-04-22 NOTE — PATIENT INSTRUCTIONS
Thank you for choosing [facility]  To Do:  FOR LENORE PEREZ    · Follow up in 2 weeks for recheck  · Home BP monitoring, bring to next visit  · Need to stop smoking  · Continue with Atorvastatin  · Follow up with urology for renal mass  · Cons Here are some examples:  Situation How I'll handle it   Finishing a meal Get up from the table and take a walk   Having an argument Find a quiet place and breathe deeply   Feeling lonely or bored Call a friend to talk         Tips for quitting successfully

## 2019-04-22 NOTE — PROGRESS NOTES
Chief Complaint:   Patient presents with:  Blood Pressure: Med check     HPI:   This is a 46year old male     HYPERTENSION  No meds x 1 day. Still smoking 1-2 packs a day. C/O on and off dizziness. Feels like the room is moving.       FATIGUE  Falls asleep Leukocytosis     Syncope and collapse     Post concussive syndrome      REVIEW OF SYSTEMS:   A comprehensive 10 point review of systems was completed. Pertinent positives and negatives noted in the the HPI.       PHYSICAL EXAM:   BP (!) 150/92   Pulse 70

## 2019-05-22 DIAGNOSIS — I10 HYPERTENSION, UNCONTROLLED: ICD-10-CM

## 2019-05-23 NOTE — TELEPHONE ENCOUNTER
Medication(s) to Refill:   Requested Prescriptions     Pending Prescriptions Disp Refills   • lisinopril 20 MG Oral Tab [Pharmacy Med Name: LISINOPRIL 20MG TABLETS] 30 tablet 0     Sig: TAKE 1 TABLET BY MOUTH EVERY DAY         Reason for Medication Refill

## 2019-05-25 RX ORDER — LISINOPRIL 20 MG/1
TABLET ORAL
Qty: 30 TABLET | Refills: 0 | Status: SHIPPED | OUTPATIENT
Start: 2019-05-25 | End: 2019-06-23

## 2019-06-23 DIAGNOSIS — I10 HYPERTENSION, UNCONTROLLED: ICD-10-CM

## 2019-06-26 RX ORDER — LISINOPRIL 20 MG/1
TABLET ORAL
Qty: 30 TABLET | Refills: 0 | Status: SHIPPED | OUTPATIENT
Start: 2019-06-26 | End: 2019-07-31

## 2019-07-02 ENCOUNTER — OFFICE VISIT (OUTPATIENT)
Dept: FAMILY MEDICINE CLINIC | Facility: CLINIC | Age: 54
End: 2019-07-02
Payer: MEDICAID

## 2019-07-02 VITALS
SYSTOLIC BLOOD PRESSURE: 138 MMHG | WEIGHT: 228 LBS | DIASTOLIC BLOOD PRESSURE: 82 MMHG | OXYGEN SATURATION: 97 % | HEART RATE: 64 BPM | RESPIRATION RATE: 18 BRPM | BODY MASS INDEX: 31 KG/M2

## 2019-07-02 DIAGNOSIS — R29.818 SUSPECTED SLEEP APNEA: ICD-10-CM

## 2019-07-02 DIAGNOSIS — Z12.5 PROSTATE CANCER SCREENING: ICD-10-CM

## 2019-07-02 DIAGNOSIS — I10 ESSENTIAL HYPERTENSION: ICD-10-CM

## 2019-07-02 DIAGNOSIS — F17.200 TOBACCO DEPENDENCE: ICD-10-CM

## 2019-07-02 DIAGNOSIS — N28.89 RENAL MASS: Primary | ICD-10-CM

## 2019-07-02 DIAGNOSIS — Z12.11 SCREENING FOR COLON CANCER: ICD-10-CM

## 2019-07-02 DIAGNOSIS — R53.83 FATIGUE, UNSPECIFIED TYPE: ICD-10-CM

## 2019-07-02 DIAGNOSIS — F17.210 HEAVY CIGARETTE SMOKER (20-39 PER DAY): ICD-10-CM

## 2019-07-02 PROCEDURE — 99214 OFFICE O/P EST MOD 30 MIN: CPT | Performed by: EMERGENCY MEDICINE

## 2019-07-02 RX ORDER — BUPROPION HYDROCHLORIDE 150 MG/1
TABLET, EXTENDED RELEASE ORAL
Qty: 60 TABLET | Refills: 1 | Status: SHIPPED | OUTPATIENT
Start: 2019-07-02 | End: 2020-06-19 | Stop reason: ALTCHOICE

## 2019-07-02 NOTE — PROGRESS NOTES
Chief Complaint:   Patient presents with:  Blood Pressure: F/u     HPI:   This is a 46year old male     HYPERTENSION  On Lisinopril and Hydrochlorothiazide. Skips HCTZ on occasion D/T fatigue. No home BP readings.   Patient denies any lightheadedness diz Place 1 patch onto the skin daily. Disp: 30 patch Rfl: 0   Naproxen Sodium (ALEVE OR) Take 2 tablets by mouth daily as needed. Disp:  Rfl:    atorvastatin 20 MG Oral Tab Take 1 tablet (20 mg total) by mouth nightly.  Disp: 90 tablet Rfl: 0     No current (W+WO) (CPT=03079); Future    2. Essential hypertension  Stable continue with lisinopril 20 and hydrochlorothiazide 25  - COMP METABOLIC PANEL (14); Future  - LIPID PANEL; Future  - URINALYSIS WITH CULTURE REFLEX; Future    3.  Heavy cigarette smoker (20-39

## 2019-07-02 NOTE — PATIENT INSTRUCTIONS
Thank you for choosing [facility]  To Do:  FOR Link Sourav    · Start Wellbutrin for smoking cessation  · Arrange for CT scan  · Have blood tests done after fasting  · Arrange for sleep study  · Dollow up with urology as scheduled  · Follow u

## 2019-07-03 ENCOUNTER — HOSPITAL ENCOUNTER (OUTPATIENT)
Dept: GENERAL RADIOLOGY | Age: 54
Discharge: HOME | End: 2019-07-03
Attending: EMERGENCY MEDICINE
Payer: MEDICAID

## 2019-07-03 ENCOUNTER — LAB ENCOUNTER (OUTPATIENT)
Dept: LAB | Age: 54
End: 2019-07-03
Attending: EMERGENCY MEDICINE
Payer: MEDICAID

## 2019-07-03 DIAGNOSIS — R10.13 EPIGASTRIC PAIN: ICD-10-CM

## 2019-07-03 DIAGNOSIS — N28.89 RENAL MASS: ICD-10-CM

## 2019-07-03 DIAGNOSIS — Z12.5 PROSTATE CANCER SCREENING: ICD-10-CM

## 2019-07-03 DIAGNOSIS — R53.83 FATIGUE, UNSPECIFIED TYPE: ICD-10-CM

## 2019-07-03 DIAGNOSIS — I10 ESSENTIAL HYPERTENSION: ICD-10-CM

## 2019-07-03 LAB
ALBUMIN SERPL-MCNC: 3.8 G/DL (ref 3.4–5)
ALBUMIN/GLOB SERPL: 1.2 {RATIO} (ref 1–2)
ALP LIVER SERPL-CCNC: 80 U/L (ref 45–117)
ALT SERPL-CCNC: 38 U/L (ref 16–61)
AMYLASE SERPL-CCNC: 47 U/L (ref 25–115)
ANION GAP SERPL CALC-SCNC: 8 MMOL/L (ref 0–18)
AST SERPL-CCNC: 26 U/L (ref 15–37)
BASOPHILS # BLD AUTO: 0.06 X10(3) UL (ref 0–0.2)
BASOPHILS NFR BLD AUTO: 0.7 %
BILIRUB SERPL-MCNC: 0.7 MG/DL (ref 0.1–2)
BILIRUB UR QL STRIP.AUTO: NEGATIVE
BUN BLD-MCNC: 14 MG/DL (ref 7–18)
BUN/CREAT SERPL: 17.1 (ref 10–20)
CALCIUM BLD-MCNC: 9.1 MG/DL (ref 8.5–10.1)
CHLORIDE SERPL-SCNC: 107 MMOL/L (ref 98–112)
CHOLEST SMN-MCNC: 178 MG/DL (ref ?–200)
CLARITY UR REFRACT.AUTO: CLEAR
CO2 SERPL-SCNC: 26 MMOL/L (ref 21–32)
COLOR UR AUTO: YELLOW
COMPLEXED PSA SERPL-MCNC: 1.84 NG/ML (ref ?–4)
CREAT BLD-MCNC: 0.82 MG/DL (ref 0.7–1.3)
DEPRECATED RDW RBC AUTO: 43.2 FL (ref 35.1–46.3)
EOSINOPHIL # BLD AUTO: 0.67 X10(3) UL (ref 0–0.7)
EOSINOPHIL NFR BLD AUTO: 8 %
ERYTHROCYTE [DISTWIDTH] IN BLOOD BY AUTOMATED COUNT: 12.7 % (ref 11–15)
GLOBULIN PLAS-MCNC: 3.2 G/DL (ref 2.8–4.4)
GLUCOSE BLD-MCNC: 92 MG/DL (ref 70–99)
GLUCOSE UR STRIP.AUTO-MCNC: NEGATIVE MG/DL
HCT VFR BLD AUTO: 48 % (ref 39–53)
HDLC SERPL-MCNC: 31 MG/DL (ref 40–59)
HGB BLD-MCNC: 16.5 G/DL (ref 13–17.5)
IMM GRANULOCYTES # BLD AUTO: 0.03 X10(3) UL (ref 0–1)
IMM GRANULOCYTES NFR BLD: 0.4 %
KETONES UR STRIP.AUTO-MCNC: NEGATIVE MG/DL
LDLC SERPL CALC-MCNC: 126 MG/DL (ref ?–100)
LEUKOCYTE ESTERASE UR QL STRIP.AUTO: NEGATIVE
LIPASE SERPL-CCNC: 116 U/L (ref 73–393)
LYMPHOCYTES # BLD AUTO: 2.31 X10(3) UL (ref 1–4)
LYMPHOCYTES NFR BLD AUTO: 27.6 %
M PROTEIN MFR SERPL ELPH: 7 G/DL (ref 6.4–8.2)
MCH RBC QN AUTO: 31.8 PG (ref 26–34)
MCHC RBC AUTO-ENTMCNC: 34.4 G/DL (ref 31–37)
MCV RBC AUTO: 92.5 FL (ref 80–100)
MONOCYTES # BLD AUTO: 0.76 X10(3) UL (ref 0.1–1)
MONOCYTES NFR BLD AUTO: 9.1 %
NEUTROPHILS # BLD AUTO: 4.53 X10 (3) UL (ref 1.5–7.7)
NEUTROPHILS # BLD AUTO: 4.53 X10(3) UL (ref 1.5–7.7)
NEUTROPHILS NFR BLD AUTO: 54.2 %
NITRITE UR QL STRIP.AUTO: NEGATIVE
NONHDLC SERPL-MCNC: 147 MG/DL (ref ?–130)
OSMOLALITY SERPL CALC.SUM OF ELEC: 292 MOSM/KG (ref 275–295)
PH UR STRIP.AUTO: 6 [PH] (ref 4.5–8)
PLATELET # BLD AUTO: 172 10(3)UL (ref 150–450)
POTASSIUM SERPL-SCNC: 3.5 MMOL/L (ref 3.5–5.1)
PROT UR STRIP.AUTO-MCNC: NEGATIVE MG/DL
RBC # BLD AUTO: 5.19 X10(6)UL (ref 4.3–5.7)
RBC UR QL AUTO: NEGATIVE
SODIUM SERPL-SCNC: 141 MMOL/L (ref 136–145)
SP GR UR STRIP.AUTO: 1.02 (ref 1–1.03)
TRIGL SERPL-MCNC: 104 MG/DL (ref 30–149)
TSI SER-ACNC: 1.1 MIU/ML (ref 0.36–3.74)
UROBILINOGEN UR STRIP.AUTO-MCNC: <2 MG/DL
VLDLC SERPL CALC-MCNC: 21 MG/DL (ref 0–30)
WBC # BLD AUTO: 8.4 X10(3) UL (ref 4–11)

## 2019-07-03 PROCEDURE — 83690 ASSAY OF LIPASE: CPT

## 2019-07-03 PROCEDURE — 80053 COMPREHEN METABOLIC PANEL: CPT

## 2019-07-03 PROCEDURE — 36415 COLL VENOUS BLD VENIPUNCTURE: CPT

## 2019-07-03 PROCEDURE — 84443 ASSAY THYROID STIM HORMONE: CPT

## 2019-07-03 PROCEDURE — 80061 LIPID PANEL: CPT

## 2019-07-03 PROCEDURE — 81003 URINALYSIS AUTO W/O SCOPE: CPT

## 2019-07-03 PROCEDURE — 85025 COMPLETE CBC W/AUTO DIFF WBC: CPT

## 2019-07-03 PROCEDURE — 71046 X-RAY EXAM CHEST 2 VIEWS: CPT | Performed by: EMERGENCY MEDICINE

## 2019-07-03 PROCEDURE — 82150 ASSAY OF AMYLASE: CPT

## 2019-07-04 PROBLEM — R29.818 SUSPECTED SLEEP APNEA: Status: ACTIVE | Noted: 2019-07-04

## 2019-07-05 ENCOUNTER — TELEPHONE (OUTPATIENT)
Dept: FAMILY MEDICINE CLINIC | Facility: CLINIC | Age: 54
End: 2019-07-05

## 2019-07-06 ENCOUNTER — HOSPITAL ENCOUNTER (OUTPATIENT)
Dept: CT IMAGING | Age: 54
Discharge: HOME | End: 2019-07-06
Attending: EMERGENCY MEDICINE
Payer: MEDICAID

## 2019-07-06 DIAGNOSIS — N28.89 RENAL MASS: ICD-10-CM

## 2019-07-06 LAB — CREAT BLD-MCNC: 0.7 MG/DL (ref 0.7–1.3)

## 2019-07-06 PROCEDURE — 74170 CT ABD WO CNTRST FLWD CNTRST: CPT | Performed by: EMERGENCY MEDICINE

## 2019-07-06 PROCEDURE — 82565 ASSAY OF CREATININE: CPT

## 2019-07-09 ENCOUNTER — TELEPHONE (OUTPATIENT)
Dept: FAMILY MEDICINE CLINIC | Facility: CLINIC | Age: 54
End: 2019-07-09

## 2019-07-09 NOTE — TELEPHONE ENCOUNTER
----- Message from Dale Mesa MD sent at 7/6/2019  4:28 PM CDT -----  Renal mass appears to be stable, still cannot r/o underlying malignancy  Still needs to follow-up with urology.   Continue follow-up with urology as scheduled

## 2019-07-11 NOTE — TELEPHONE ENCOUNTER
Called and spoke with pt's wife, ok per HIPPA. Pt's wife informed of CT results below. Pt's wife states understanding and will have pt keep his appointment with urology 7/18/19. Pt's wife has no further questions at this time.

## 2019-07-16 ENCOUNTER — TELEPHONE (OUTPATIENT)
Dept: FAMILY MEDICINE CLINIC | Facility: CLINIC | Age: 54
End: 2019-07-16

## 2019-07-16 DIAGNOSIS — E78.00 HYPERCHOLESTEROLEMIA: Primary | ICD-10-CM

## 2019-07-16 RX ORDER — ATORVASTATIN CALCIUM 20 MG/1
20 TABLET, FILM COATED ORAL NIGHTLY
Qty: 90 TABLET | Refills: 0 | Status: SHIPPED | OUTPATIENT
Start: 2019-07-16 | End: 2021-04-10

## 2019-07-16 NOTE — TELEPHONE ENCOUNTER
----- Message from Cynthia Navarrete MD sent at 7/14/2019  9:44 PM CDT -----  Pt needs to restart Atorvastatin 20 mg daily  Recheck lipids  And CMP in 1 month    Rest of labs stable

## 2019-07-16 NOTE — TELEPHONE ENCOUNTER
Spoke with pt regarding results and instructions listed below. Pt verbalizes understanding. Pt agreeable to restarting, pt states he has a few tablets left. Rx sent to local pharmacy. Repeat lab placed for 1 month.

## 2019-07-26 ENCOUNTER — OFFICE VISIT (OUTPATIENT)
Dept: SURGERY | Facility: CLINIC | Age: 54
End: 2019-07-26
Payer: MEDICAID

## 2019-07-26 VITALS
DIASTOLIC BLOOD PRESSURE: 70 MMHG | TEMPERATURE: 98 F | BODY MASS INDEX: 31.92 KG/M2 | HEIGHT: 71 IN | HEART RATE: 62 BPM | SYSTOLIC BLOOD PRESSURE: 106 MMHG | WEIGHT: 228 LBS

## 2019-07-26 DIAGNOSIS — N28.89 RENAL MASS: Primary | ICD-10-CM

## 2019-07-26 PROCEDURE — 99244 OFF/OP CNSLTJ NEW/EST MOD 40: CPT | Performed by: UROLOGY

## 2019-07-26 NOTE — PROGRESS NOTES
SUBJECTIVE:  Trudy Lynn is a 46year old male who presents for a consultation at the request of, and a copy of this note will be sent to, Dr. Roseanne Mack, for evaluation of  Right renal mass. He states that the problem is unchanged.  Symptoms include note shortness of breath,  sputum production,chest pain or pleurisy. CARDIOVASCULAR:  Negative for pain or chest discomfort, dizziness or fainting, palpitations, leg swelling, nocturia, or claudication.   GASTROINTESTINAL:  Negative for nausea, vomiting, diarrh document stability. Patient understands plan and agrees and will follow-up accordingly.     Meds This Visit:  Requested Prescriptions      No prescriptions requested or ordered in this encounter       Imaging & Referrals:  None

## 2019-07-31 DIAGNOSIS — I10 HYPERTENSION, UNCONTROLLED: ICD-10-CM

## 2019-07-31 RX ORDER — HYDROCHLOROTHIAZIDE 25 MG/1
TABLET ORAL
Qty: 90 TABLET | Refills: 0 | Status: SHIPPED | OUTPATIENT
Start: 2019-07-31 | End: 2019-12-16

## 2019-07-31 RX ORDER — LISINOPRIL 20 MG/1
TABLET ORAL
Qty: 30 TABLET | Refills: 0 | Status: SHIPPED | OUTPATIENT
Start: 2019-07-31 | End: 2019-08-27

## 2019-07-31 NOTE — TELEPHONE ENCOUNTER
Medication(s) to Refill:   Requested Prescriptions     Pending Prescriptions Disp Refills   • LISINOPRIL 20 MG Oral Tab [Pharmacy Med Name: LISINOPRIL 20MG TABLETS] 30 tablet 0     Sig: TAKE 1 TABLET BY MOUTH EVERY DAY   • hydrochlorothiazide 25 MG Oral Ta

## 2019-08-27 DIAGNOSIS — I10 HYPERTENSION, UNCONTROLLED: ICD-10-CM

## 2019-08-27 RX ORDER — LISINOPRIL 20 MG/1
TABLET ORAL
Qty: 90 TABLET | Refills: 1 | Status: SHIPPED | OUTPATIENT
Start: 2019-08-27 | End: 2020-04-13

## 2019-08-27 NOTE — TELEPHONE ENCOUNTER
Medication(s) to Refill:   Requested Prescriptions     Pending Prescriptions Disp Refills   • LISINOPRIL 20 MG Oral Tab [Pharmacy Med Name: LISINOPRIL 20MG TABLETS] 30 tablet 0     Sig: TAKE 1 TABLET BY MOUTH EVERY DAY           Last Time Medication was Fi

## 2019-12-16 DIAGNOSIS — I10 HYPERTENSION, UNCONTROLLED: ICD-10-CM

## 2019-12-16 NOTE — TELEPHONE ENCOUNTER
Medication(s) to Refill:   Requested Prescriptions     Pending Prescriptions Disp Refills   • HYDROCHLOROTHIAZIDE 25 MG Oral Tab [Pharmacy Med Name: HYDROCHLOROTHIAZIDE 25MG TABLETS] 90 tablet 0     Sig: TAKE 1 TABLET BY MOUTH EVERY MORNING         Reason

## 2019-12-20 RX ORDER — HYDROCHLOROTHIAZIDE 25 MG/1
TABLET ORAL
Qty: 90 TABLET | Refills: 0 | Status: SHIPPED | OUTPATIENT
Start: 2019-12-20 | End: 2020-04-13

## 2019-12-20 NOTE — TELEPHONE ENCOUNTER
LVM for patient that medication was approved with no refills and to call us back to schedule office visit for refills

## 2020-04-13 DIAGNOSIS — I10 HYPERTENSION, UNCONTROLLED: ICD-10-CM

## 2020-04-13 RX ORDER — HYDROCHLOROTHIAZIDE 25 MG/1
TABLET ORAL
Qty: 30 TABLET | Refills: 0 | Status: SHIPPED | OUTPATIENT
Start: 2020-04-13 | End: 2020-05-20

## 2020-04-13 RX ORDER — LISINOPRIL 20 MG/1
TABLET ORAL
Qty: 30 TABLET | Refills: 0 | Status: SHIPPED | OUTPATIENT
Start: 2020-04-13 | End: 2020-05-20

## 2020-04-13 NOTE — TELEPHONE ENCOUNTER
Medication(s) to Refill:   Requested Prescriptions     Pending Prescriptions Disp Refills   • LISINOPRIL 20 MG Oral Tab [Pharmacy Med Name: LISINOPRIL 20MG TABLETS] 90 tablet 1     Sig: TAKE 1 TABLET BY MOUTH EVERY DAY         Reason for Medication Refill

## 2020-04-17 ENCOUNTER — VIRTUAL PHONE E/M (OUTPATIENT)
Dept: FAMILY MEDICINE CLINIC | Facility: CLINIC | Age: 55
End: 2020-04-17
Payer: MEDICAID

## 2020-04-17 DIAGNOSIS — R07.89 CHEST PAIN, ATYPICAL: Primary | ICD-10-CM

## 2020-04-17 PROCEDURE — 99214 OFFICE O/P EST MOD 30 MIN: CPT | Performed by: FAMILY MEDICINE

## 2020-04-17 RX ORDER — CYCLOBENZAPRINE HCL 10 MG
10 TABLET ORAL 3 TIMES DAILY
Qty: 30 TABLET | Refills: 1 | Status: SHIPPED | OUTPATIENT
Start: 2020-04-17 | End: 2020-05-07

## 2020-04-17 RX ORDER — PREDNISONE 20 MG/1
40 TABLET ORAL DAILY
Qty: 10 TABLET | Refills: 0 | Status: SHIPPED | OUTPATIENT
Start: 2020-04-17 | End: 2020-04-22

## 2020-04-17 NOTE — PROGRESS NOTES
Virtual Telephone Check-In    Andrew Sarabia verbally consents to a Virtual/Telephone Check-In visit on 04/17/20. Virtual visit conducted in lieu of face to face encounter due to Covid-19 pandemic.     Patient understands and accepts financial responsib

## 2020-05-18 DIAGNOSIS — I10 HYPERTENSION, UNCONTROLLED: ICD-10-CM

## 2020-05-20 RX ORDER — LISINOPRIL 20 MG/1
TABLET ORAL
Qty: 30 TABLET | Refills: 0 | Status: SHIPPED | OUTPATIENT
Start: 2020-05-20 | End: 2020-05-21

## 2020-05-20 RX ORDER — HYDROCHLOROTHIAZIDE 25 MG/1
TABLET ORAL
Qty: 30 TABLET | Refills: 0 | Status: SHIPPED | OUTPATIENT
Start: 2020-05-20 | End: 2020-05-21

## 2020-05-21 ENCOUNTER — VIRTUAL PHONE E/M (OUTPATIENT)
Dept: FAMILY MEDICINE CLINIC | Facility: CLINIC | Age: 55
End: 2020-05-21
Payer: MEDICAID

## 2020-05-21 DIAGNOSIS — I10 HYPERTENSION, UNCONTROLLED: ICD-10-CM

## 2020-05-21 PROCEDURE — 99213 OFFICE O/P EST LOW 20 MIN: CPT | Performed by: EMERGENCY MEDICINE

## 2020-05-21 RX ORDER — LISINOPRIL 20 MG/1
20 TABLET ORAL DAILY
Qty: 30 TABLET | Refills: 0 | Status: SHIPPED | OUTPATIENT
Start: 2020-05-21 | End: 2020-07-29

## 2020-05-21 RX ORDER — HYDROCHLOROTHIAZIDE 25 MG/1
25 TABLET ORAL EVERY MORNING
Qty: 30 TABLET | Refills: 0 | Status: SHIPPED | OUTPATIENT
Start: 2020-05-21 | End: 2020-07-25

## 2020-05-21 NOTE — PROGRESS NOTES
This is a telemedicine visit with live, interactive and audio. Romelia Angelucci verbally consents to a Virtual/Telephone Check-In visit on 05/21/20.     Patient understands and accepts financial responsibility for any deductible, co-insurance and/or co-p normotensive   meds refilled  Advised OV in 3 months for recheck  -     hydrochlorothiazide 25 MG Oral Tab; Take 1 tablet (25 mg total) by mouth every morning.  -    lisinopril 20 MG Oral Tab; Take 1 tablet (20 mg total) by mouth daily.         Jasmyn Sethi

## 2020-06-19 PROBLEM — F33.1 MODERATE EPISODE OF RECURRENT MAJOR DEPRESSIVE DISORDER (HCC): Status: ACTIVE | Noted: 2020-06-19

## 2020-06-19 NOTE — PROGRESS NOTES
Chief Complaint:   Patient presents with: Anxiety: unable to sleep 1 week     HPI:   This is a 53year old male         ANXIETY  Wife recently left. Asking for divorce.   Has been 2 weeksHas been crying a lot  Cant stop crying    Poor appetite  Has alrea Meds:  hydrochlorothiazide 25 MG Oral Tab, Take 1 tablet (25 mg total) by mouth every morning., Disp: 30 tablet, Rfl: 0  lisinopril 20 MG Oral Tab, Take 1 tablet (20 mg total) by mouth daily. , Disp: 30 tablet, Rfl: 0  atorvastatin 20 MG Oral Tab, Take 1 ta thyromegaly  LUNGS: clear to auscultation, no RRW  CARDIO: RRR without murmur      MENTAL STATUS EXAMINATION: The patient is alert and oriented. Dress and hygiene are fair. Looks his stated age. Calm and cooperative tearful sad crying. Good eye contact.  No worsen. Patient understood the above plan agreed and accepted risks.     · Restart Wellbutrin  · Take Hydroxyzine for anxiety and sleep  · Continue with therapy and counseling  · Call if with any medication problems  · Go to the ER if with any worsening of

## 2020-06-19 NOTE — PATIENT INSTRUCTIONS
Thank you for choosing [facility]  To Do:  FOR LENORE PEREZ    · Restart Wellbutrin  · Take Hydroxyzine for anxiety and sleep  · Continue with therapy and counseling  · Call if with any medication problems  · Go to the ER if with any worsening you and your therapist decide together at what point you no longer need therapy. Additional sources of help  In addition to a professional counselor, it may help to talk to other people in your life.  You may find support and insight from:   · A close fri makes depression worse. Medicines  Depression can be a side effect of some medicines for high blood pressure, cancer, pain, and other health problems.  So tell your doctor about how you are feeling and discuss the role your medicines may play in your symp much  · Gain or lose weight  · Feel low on energy or always tired  · Have a hard time focusing or making decisions  · Lose interest in sex  · Have physical symptoms, such as stomachaches, headaches, or backaches  Know the serious signs  Never ignore a pers energy, attend local conferences or support groups. Keep a list of useful websites and helpful books and use them as needed. This illness is not your fault. Don’t blame yourself for your depression. · Manage early symptoms.  If you notice symptoms returnin minutes of moderate activity a day. Walking in small blocks of time (5-10 minutes) is a good way to start, but anything that gets you moving (gardening, house cleaning) counts. · Don't use drugs and alcohol. These may ease the pain in the short term.  But

## 2020-07-24 DIAGNOSIS — I10 HYPERTENSION, UNCONTROLLED: ICD-10-CM

## 2020-07-24 NOTE — TELEPHONE ENCOUNTER
Medication(s) to Refill:   Requested Prescriptions     Pending Prescriptions Disp Refills   • HYDROCHLOROTHIAZIDE 25 MG Oral Tab [Pharmacy Med Name: HYDROCHLOROTHIAZIDE 25MG TABLETS] 30 tablet 0     Sig: TAKE 1 TABLET(25 MG) BY MOUTH EVERY MORNING

## 2020-07-25 DIAGNOSIS — I10 HYPERTENSION, UNCONTROLLED: ICD-10-CM

## 2020-07-25 RX ORDER — HYDROCHLOROTHIAZIDE 25 MG/1
TABLET ORAL
Qty: 30 TABLET | Refills: 2 | Status: SHIPPED | OUTPATIENT
Start: 2020-07-25 | End: 2020-10-09

## 2020-07-27 NOTE — TELEPHONE ENCOUNTER
Medication(s) to Refill:   Requested Prescriptions     Pending Prescriptions Disp Refills   • LISINOPRIL 20 MG Oral Tab [Pharmacy Med Name: LISINOPRIL 20MG TABLETS] 30 tablet 0     Sig: TAKE 1 TABLET(20 MG) BY MOUTH DAILY         Reason for Medication Refi

## 2020-07-29 RX ORDER — LISINOPRIL 20 MG/1
TABLET ORAL
Qty: 30 TABLET | Refills: 0 | Status: SHIPPED | OUTPATIENT
Start: 2020-07-29 | End: 2020-08-25

## 2020-07-29 NOTE — TELEPHONE ENCOUNTER
Patient called for status on this med, he states he has been out of this for days. Please Advise. Thank you.

## 2020-08-25 DIAGNOSIS — I10 HYPERTENSION, UNCONTROLLED: ICD-10-CM

## 2020-08-25 RX ORDER — LISINOPRIL 20 MG/1
TABLET ORAL
Qty: 30 TABLET | Refills: 0 | Status: SHIPPED | OUTPATIENT
Start: 2020-08-25 | End: 2020-10-10

## 2020-10-03 DIAGNOSIS — I10 HYPERTENSION, UNCONTROLLED: ICD-10-CM

## 2020-10-07 DIAGNOSIS — I10 HYPERTENSION, UNCONTROLLED: ICD-10-CM

## 2020-10-09 DIAGNOSIS — I10 HYPERTENSION, UNCONTROLLED: ICD-10-CM

## 2020-10-10 RX ORDER — LISINOPRIL 20 MG/1
TABLET ORAL
Qty: 30 TABLET | Refills: 0 | OUTPATIENT
Start: 2020-10-10

## 2020-10-10 RX ORDER — LISINOPRIL 20 MG/1
TABLET ORAL
Qty: 30 TABLET | Refills: 2 | OUTPATIENT
Start: 2020-10-10

## 2020-10-10 RX ORDER — LISINOPRIL 20 MG/1
TABLET ORAL
Qty: 30 TABLET | Refills: 0 | Status: SHIPPED | OUTPATIENT
Start: 2020-10-10 | End: 2020-12-02

## 2020-10-10 RX ORDER — HYDROCHLOROTHIAZIDE 25 MG/1
25 TABLET ORAL EVERY MORNING
Qty: 30 TABLET | Refills: 0 | Status: SHIPPED | OUTPATIENT
Start: 2020-10-10 | End: 2021-02-27

## 2020-12-02 ENCOUNTER — VIRTUAL PHONE E/M (OUTPATIENT)
Dept: FAMILY MEDICINE CLINIC | Facility: CLINIC | Age: 55
End: 2020-12-02
Payer: MEDICAID

## 2020-12-02 DIAGNOSIS — I10 HYPERTENSION, UNCONTROLLED: ICD-10-CM

## 2020-12-02 PROCEDURE — 99213 OFFICE O/P EST LOW 20 MIN: CPT | Performed by: NURSE PRACTITIONER

## 2020-12-02 RX ORDER — LISINOPRIL 20 MG/1
20 TABLET ORAL DAILY
Qty: 90 TABLET | Refills: 2 | Status: SHIPPED | OUTPATIENT
Start: 2020-12-02 | End: 2021-11-05

## 2020-12-02 NOTE — PROGRESS NOTES
Virtual Telephone Check-In    Jono Nuñez verbally consents to a Virtual/Telephone Check-In visit on 12/02/20. Patient has been referred to the [facility] website at [email or web address] to review the yearly Consent to Treat document.     Patient underst MG Oral Tablet 12 Hr Take 1 tab daily for 3-5 days, then take 1 tab twice a day 60 tablet 1   • atorvastatin 20 MG Oral Tab Take 1 tablet (20 mg total) by mouth nightly. 90 tablet 0   • Naproxen Sodium (ALEVE OR) Take 2 tablets by mouth daily as needed.

## 2021-02-26 DIAGNOSIS — I10 HYPERTENSION, UNCONTROLLED: ICD-10-CM

## 2021-02-27 RX ORDER — HYDROCHLOROTHIAZIDE 25 MG/1
25 TABLET ORAL EVERY MORNING
Qty: 30 TABLET | Refills: 0 | Status: SHIPPED | OUTPATIENT
Start: 2021-02-27 | End: 2021-04-02

## 2021-03-30 ENCOUNTER — TELEPHONE (OUTPATIENT)
Dept: FAMILY MEDICINE CLINIC | Facility: CLINIC | Age: 56
End: 2021-03-30

## 2021-03-30 NOTE — TELEPHONE ENCOUNTER
Last office visit:  12/20/20  (if over 1 year, schedule appointment)    Appointment scheduled with: 04/06/21    Requested medication: hydrochlorothiazide 25 MG Oral Tab  lisinopril 20 MG Oral Tab  Pharmacy:    [facility] [address] [place] [address], [address] [place] [address]

## 2021-03-30 NOTE — TELEPHONE ENCOUNTER
PSR: Please schedule an OV for pt. Thank you. Called pt and left vm to call office back. Per Dr. Myrna Carter note from 2/27/21 \"Needs office visit before next refill, 30 d supply only\". Office number provided. Awaiting call back from pt.

## 2021-03-31 DIAGNOSIS — I10 HYPERTENSION, UNCONTROLLED: ICD-10-CM

## 2021-04-02 RX ORDER — HYDROCHLOROTHIAZIDE 25 MG/1
TABLET ORAL
Qty: 30 TABLET | Refills: 0 | Status: SHIPPED | OUTPATIENT
Start: 2021-04-02 | End: 2021-11-05

## 2021-04-06 ENCOUNTER — OFFICE VISIT (OUTPATIENT)
Dept: FAMILY MEDICINE CLINIC | Facility: CLINIC | Age: 56
End: 2021-04-06
Payer: MEDICAID

## 2021-04-06 VITALS
HEIGHT: 71 IN | TEMPERATURE: 99 F | RESPIRATION RATE: 17 BRPM | BODY MASS INDEX: 31.36 KG/M2 | SYSTOLIC BLOOD PRESSURE: 152 MMHG | DIASTOLIC BLOOD PRESSURE: 90 MMHG | WEIGHT: 224 LBS | OXYGEN SATURATION: 97 % | HEART RATE: 77 BPM

## 2021-04-06 DIAGNOSIS — M54.50 BILATERAL LOW BACK PAIN WITHOUT SCIATICA, UNSPECIFIED CHRONICITY: ICD-10-CM

## 2021-04-06 DIAGNOSIS — E78.00 HYPERCHOLESTEROLEMIA: ICD-10-CM

## 2021-04-06 DIAGNOSIS — N28.89 RENAL MASS: ICD-10-CM

## 2021-04-06 DIAGNOSIS — I10 HYPERTENSION, UNCONTROLLED: Primary | ICD-10-CM

## 2021-04-06 PROCEDURE — 3080F DIAST BP >= 90 MM HG: CPT | Performed by: EMERGENCY MEDICINE

## 2021-04-06 PROCEDURE — 99214 OFFICE O/P EST MOD 30 MIN: CPT | Performed by: EMERGENCY MEDICINE

## 2021-04-06 PROCEDURE — 3008F BODY MASS INDEX DOCD: CPT | Performed by: EMERGENCY MEDICINE

## 2021-04-06 PROCEDURE — 3077F SYST BP >= 140 MM HG: CPT | Performed by: EMERGENCY MEDICINE

## 2021-04-06 RX ORDER — AMLODIPINE BESYLATE 5 MG/1
5 TABLET ORAL DAILY
Qty: 90 TABLET | Refills: 0 | Status: SHIPPED | OUTPATIENT
Start: 2021-04-06 | End: 2021-11-05

## 2021-04-06 NOTE — PROGRESS NOTES
Chief Complaint:   Patient presents with:  Blood Pressure: F/u    HPI:   This is a 63year old male       HYPERTENSION    On Lisinopril and hydrochlorothiazide  No CP no SOB  Compliant with medications  HOme BP checks stable per chevy  Smoking 2 packs Renal mass     Hypercholesterolemia     Calculus of gallbladder without cholecystitis without obstruction     Leukocytosis     Syncope and collapse     Post concussive syndrome     Osteochondral talar dome lesion     Neuritis of ankle     Nerve injury muscle strength on hip flexion knee extension      ASSESSMENT AND PLAN:         1.  Hypertension, uncontrolled  Uncontrolled  Will add amlodipine  Continue lisinopril hydrochlorothiazide  Follow up in 1 month for recheck  - amLODIPine Besylate 5 MG Oral Tab

## 2021-04-06 NOTE — PATIENT INSTRUCTIONS
Thank you for choosing [facility]  To Do:  [address]        1. Need to stop smoking  2. Continue with lisinopril and hydrochlorothiazide  3.  Follow up with Dr. Dario Santa regarding Kidney mass, Dr. Dario Santa, you are due for a repeat CT scan amounts. Limit saturated fats  Saturated fats come from animals. They also come from certain plants such as coconut and palm. Eating too much saturated fat can raise your blood cholesterol levels. This can make your artery problems worse.  Your goal is to your diet, eating healthier fats, getting more physical activity, and losing weight if you are overweight. You may also be advised to avoid or limit alcohol.      Reading food labels  Most foods now have Nutrition Facts labels that give you the details abou the lower number. This is the pressure when the heart relaxes between beats.   Blood pressure is categorized as normal, elevated, or stage 1 or stage 2 high blood pressure:  · Normal blood pressure is systolic of less than 394 and diastolic of less than 80 building entrances to walk more. · Use stairs instead of the elevator. · When you can, walk or bike instead of driving. · [place] leaves, garden, or do household repairs.   · Be active at a moderate to vigorous level of physical activity for at least 40 min and protect your health. Blood pressure measurements are given as 2 numbers. Systolic blood pressure is the upper number. This is the pressure when the heart contracts or pumps. Diastolic blood pressure is the lower number.  This is the pressure when the h cook.  ? Request no added salt when you go to a restaurant. ?  The American Heart Association [facility]) says the ideal amount of sodium is no more than 1,500 mg a day.  But because Americans eat so much salt, you can make a positive change by cutting back to e educational content on 8/1/2019  © 6956-1922 [employer]. All rights reserved. This information is not intended as a substitute for professional medical care. Always follow your healthcare professional's instructions.         Uncontrolled High problems. It raises your risk for heart attack, stroke, and heart failure. But you can do many things to manage your blood pressure. In general, if you have high blood pressure, keeping your blood pressure below 130/80 mmHg may help prevent these problems. of any program to lower blood pressure. Learn ways to relax. These include meditation, yoga, and biofeedback. · If medicines were prescribed, take them exactly as directed. Missing doses may cause your blood pressure to get out of control.  Don't stop taki the monitor with you to your next appointment. · Call your provider if you have several high readings. Don't be frightened by a single high reading, but if you get several high readings, check in with your healthcare provider.   · Note: When blood pressure up of the heart and blood vessels that carry blood through the body. Your heart is the pump for this system. With each heartbeat (contraction), the heart sends blood out through large blood vessels called arteries.  Blood pressure is a measure of how hard t pressure is when systolic is 748 or higher or the diastolic is 90 or higher  High blood pressure is diagnosed when multiple, separate readings show blood pressure above 130/80 mmHg.  Talk with your healthcare provider if you have questions or concerns about help lower your blood pressure. · Stop smoking. Smoking increases blood pressure and damages blood vessels. · Limit alcohol. Drinking too much alcohol can raise blood pressure. Men should have no more than 2 drinks a day.  Women should have no more than 1 be easiest for you. Risk factors you can’t control  Though you can’t change any of the things listed below, check off the ones that apply to you. The more boxes you check, the greater your risk for high blood pressure. Family history  ?  One or both of yo may not have any symptoms.  If you do have symptoms, they may include:  · Headache  · Dizziness  · Changes in your vision  · Chest pain  · Shortness of breath  But even without symptoms, high blood pressure that’s not treated raises your risk for heart josh would be best for you. It doesn't have to be hard. Even brisk walking for 20 minutes 3 times a week is a good form of exercise. · Don’t take medicines that stimulate the heart.  This includes many over-the-counter cold and sinus decongestant pills and spra chair). Keep your feet on the floor uncrossed. Place your arm on a solid flat surface (such as a table) with the upper part of the arm at heart level. Place the middle of the cuff directly above the bend of the elbow.  Check the monitor's instruction manual instructions.

## 2021-04-10 RX ORDER — ATORVASTATIN CALCIUM 20 MG/1
20 TABLET, FILM COATED ORAL NIGHTLY
Qty: 90 TABLET | Refills: 0 | Status: SHIPPED | OUTPATIENT
Start: 2021-04-10 | End: 2021-11-05

## 2021-06-17 DIAGNOSIS — I10 HYPERTENSION, UNCONTROLLED: ICD-10-CM

## 2021-06-17 RX ORDER — HYDROCHLOROTHIAZIDE 25 MG/1
TABLET ORAL
Qty: 30 TABLET | Refills: 0 | OUTPATIENT
Start: 2021-06-17

## 2021-10-26 DIAGNOSIS — I10 HYPERTENSION, UNCONTROLLED: ICD-10-CM

## 2021-10-26 RX ORDER — LISINOPRIL 20 MG/1
TABLET ORAL
Qty: 30 TABLET | Refills: 0 | OUTPATIENT
Start: 2021-10-26

## 2021-10-26 NOTE — TELEPHONE ENCOUNTER
Medication(s) to Refill:   Requested Prescriptions     Pending Prescriptions Disp Refills   • LISINOPRIL 20 MG Oral Tab [Pharmacy Med Name: LISINOPRIL 20MG TABLETS] 90 tablet 2     Sig: TAKE 1 TABLET(20 MG) BY MOUTH DAILY         Reason for Medication Refi

## 2021-11-05 ENCOUNTER — OFFICE VISIT (OUTPATIENT)
Dept: FAMILY MEDICINE CLINIC | Facility: CLINIC | Age: 56
End: 2021-11-05
Payer: MEDICAID

## 2021-11-05 VITALS
RESPIRATION RATE: 18 BRPM | WEIGHT: 230.38 LBS | SYSTOLIC BLOOD PRESSURE: 160 MMHG | BODY MASS INDEX: 32.25 KG/M2 | HEIGHT: 71 IN | HEART RATE: 67 BPM | DIASTOLIC BLOOD PRESSURE: 92 MMHG | OXYGEN SATURATION: 99 %

## 2021-11-05 DIAGNOSIS — Z12.5 SCREENING PSA (PROSTATE SPECIFIC ANTIGEN): ICD-10-CM

## 2021-11-05 DIAGNOSIS — Z13.29 SCREENING FOR ENDOCRINE, NUTRITIONAL, METABOLIC AND IMMUNITY DISORDER: ICD-10-CM

## 2021-11-05 DIAGNOSIS — F33.1 MODERATE EPISODE OF RECURRENT MAJOR DEPRESSIVE DISORDER (HCC): ICD-10-CM

## 2021-11-05 DIAGNOSIS — Z12.11 SCREENING FOR COLON CANCER: ICD-10-CM

## 2021-11-05 DIAGNOSIS — Z78.9 HEAVY ALCOHOL USE: ICD-10-CM

## 2021-11-05 DIAGNOSIS — Z13.0 SCREENING FOR ENDOCRINE, NUTRITIONAL, METABOLIC AND IMMUNITY DISORDER: ICD-10-CM

## 2021-11-05 DIAGNOSIS — E78.00 HYPERCHOLESTEROLEMIA: ICD-10-CM

## 2021-11-05 DIAGNOSIS — F17.210 HEAVY CIGARETTE SMOKER (20-39 PER DAY): ICD-10-CM

## 2021-11-05 DIAGNOSIS — Z13.228 SCREENING FOR ENDOCRINE, NUTRITIONAL, METABOLIC AND IMMUNITY DISORDER: ICD-10-CM

## 2021-11-05 DIAGNOSIS — I10 HYPERTENSION, UNCONTROLLED: Primary | ICD-10-CM

## 2021-11-05 DIAGNOSIS — Z13.21 SCREENING FOR ENDOCRINE, NUTRITIONAL, METABOLIC AND IMMUNITY DISORDER: ICD-10-CM

## 2021-11-05 PROBLEM — F10.90 HEAVY ALCOHOL USE: Status: ACTIVE | Noted: 2021-11-05

## 2021-11-05 PROBLEM — Z72.89 HEAVY ALCOHOL USE: Status: ACTIVE | Noted: 2021-11-05

## 2021-11-05 PROCEDURE — 99214 OFFICE O/P EST MOD 30 MIN: CPT | Performed by: EMERGENCY MEDICINE

## 2021-11-05 PROCEDURE — 3008F BODY MASS INDEX DOCD: CPT | Performed by: EMERGENCY MEDICINE

## 2021-11-05 PROCEDURE — 3077F SYST BP >= 140 MM HG: CPT | Performed by: EMERGENCY MEDICINE

## 2021-11-05 PROCEDURE — 3080F DIAST BP >= 90 MM HG: CPT | Performed by: EMERGENCY MEDICINE

## 2021-11-05 RX ORDER — HYDROCHLOROTHIAZIDE 25 MG/1
25 TABLET ORAL EVERY MORNING
Qty: 90 TABLET | Refills: 0 | Status: SHIPPED | OUTPATIENT
Start: 2021-11-05 | End: 2022-02-03

## 2021-11-05 RX ORDER — BUPROPION HYDROCHLORIDE 150 MG/1
150 TABLET ORAL DAILY
Qty: 30 TABLET | Refills: 0 | Status: SHIPPED | OUTPATIENT
Start: 2021-11-05 | End: 2021-12-29

## 2021-11-05 RX ORDER — ATORVASTATIN CALCIUM 20 MG/1
20 TABLET, FILM COATED ORAL NIGHTLY
Qty: 90 TABLET | Refills: 0 | Status: SHIPPED | OUTPATIENT
Start: 2021-11-05 | End: 2022-02-03

## 2021-11-05 RX ORDER — LISINOPRIL 20 MG/1
20 TABLET ORAL DAILY
Qty: 90 TABLET | Refills: 0 | Status: SHIPPED | OUTPATIENT
Start: 2021-11-05 | End: 2022-02-03

## 2021-11-05 RX ORDER — AMLODIPINE BESYLATE 5 MG/1
5 TABLET ORAL DAILY
Qty: 90 TABLET | Refills: 0 | Status: SHIPPED | OUTPATIENT
Start: 2021-11-05 | End: 2022-02-03

## 2021-11-05 NOTE — PATIENT INSTRUCTIONS
Thank you for choosing [facility]  To Do:  [address] Geraldine Soto        1. Resume all medications for blood pressure, Amlodipine 5 mg, Lisinopril 20 mg, hydrochlorothiazide 25  2. Daily BP checks, record and bring to next visit  3.  Follow up in 2 w sodium  · Packaged gravies and sauces  · Olives, pickles, and relish  · Bottled salad dressings    For snacks and desserts  · Yogurt  · Unsalted, air-popped popcorn  · Unsalted nuts or seeds  Stay away from  · Pies and cakes  · Packaged dessert mixes  · Pi your total cholesterol level:  Eat less unhealthy fat  · Cut back on saturated fats and trans fats (also called hydrogenated) by selecting lean cuts of meat, low-fat dairy, and using oils instead of solid fats.  Limit baked goods, processed meats, and fried effective and safe. Taking medicine is not a substitute for exercise or watching your diet! Your healthcare provider can tell you whether you might benefit from a cholesterol-lowering medicine.   Reid last reviewed this educational content on 6/1/2019

## 2021-11-05 NOTE — PROGRESS NOTES
Chief Complaint:   Patient presents with:  Medication Request    HPI:   This is a 63year old male       HYPERTENSION    Currently on amlodipine 5 , hydrochlorothiazide 25 and lisiniopril 20  Has not taken amlodipine and hydrochlorothiazide x 5 days  No EXAM:   BP (!) 160/92   Pulse 67   Resp 18   Ht 5' 11\" (1.803 m)   Wt 230 lb 6.4 oz (104.5 kg)   SpO2 99%   BMI 32.13 kg/m²  Estimated body mass index is 32.13 kg/m² as calculated from the following:    Height as of this encounter: 5' 11\" (1.803 m).     [facility] use  Recommend counseling and therapy    7. Screening for endocrine, nutritional, metabolic and immunity disorder  - CBC WITH DIFFERENTIAL WITH PLATELET  - COMP METABOLIC PANEL (14)  - LIPID PANEL  - TSH W REFLEX TO FREE T4    8.  Screening PSA (prostate sp

## 2021-12-29 ENCOUNTER — LAB ENCOUNTER (OUTPATIENT)
Dept: LAB | Age: 56
End: 2021-12-29
Attending: EMERGENCY MEDICINE
Payer: MEDICAID

## 2021-12-29 ENCOUNTER — TELEPHONE (OUTPATIENT)
Dept: FAMILY MEDICINE CLINIC | Facility: CLINIC | Age: 56
End: 2021-12-29

## 2021-12-29 DIAGNOSIS — Z13.29 SCREENING FOR ENDOCRINE, NUTRITIONAL, METABOLIC AND IMMUNITY DISORDER: Primary | ICD-10-CM

## 2021-12-29 DIAGNOSIS — Z13.0 SCREENING FOR ENDOCRINE, NUTRITIONAL, METABOLIC AND IMMUNITY DISORDER: Primary | ICD-10-CM

## 2021-12-29 DIAGNOSIS — Z13.228 SCREENING FOR ENDOCRINE, NUTRITIONAL, METABOLIC AND IMMUNITY DISORDER: Primary | ICD-10-CM

## 2021-12-29 DIAGNOSIS — F33.1 MODERATE EPISODE OF RECURRENT MAJOR DEPRESSIVE DISORDER (HCC): ICD-10-CM

## 2021-12-29 DIAGNOSIS — F17.210 HEAVY CIGARETTE SMOKER (20-39 PER DAY): ICD-10-CM

## 2021-12-29 DIAGNOSIS — Z12.5 SCREENING PSA (PROSTATE SPECIFIC ANTIGEN): ICD-10-CM

## 2021-12-29 DIAGNOSIS — Z13.21 SCREENING FOR ENDOCRINE, NUTRITIONAL, METABOLIC AND IMMUNITY DISORDER: Primary | ICD-10-CM

## 2021-12-29 PROCEDURE — 80061 LIPID PANEL: CPT

## 2021-12-29 RX ORDER — BUPROPION HYDROCHLORIDE 150 MG/1
150 TABLET ORAL DAILY
Qty: 30 TABLET | Refills: 0 | Status: SHIPPED | OUTPATIENT
Start: 2021-12-29 | End: 2022-01-08

## 2021-12-29 NOTE — TELEPHONE ENCOUNTER
Pt requesting BuPROPion 150MG refill.      Pharmacy:[facility] [address] [place] [address], [address] [place] [address], [phone], Geraldine Boogie

## 2022-01-03 ENCOUNTER — TELEPHONE (OUTPATIENT)
Dept: FAMILY MEDICINE CLINIC | Facility: CLINIC | Age: 57
End: 2022-01-03

## 2022-01-03 NOTE — TELEPHONE ENCOUNTER
----- Message from Miguel A Wilburn MD sent at 1/3/2022  1:53 PM CST -----  Stable labs  Ok to hold for upcoming OV

## 2022-01-04 DIAGNOSIS — F17.210 HEAVY CIGARETTE SMOKER (20-39 PER DAY): ICD-10-CM

## 2022-01-04 DIAGNOSIS — F33.1 MODERATE EPISODE OF RECURRENT MAJOR DEPRESSIVE DISORDER (HCC): ICD-10-CM

## 2022-01-04 NOTE — TELEPHONE ENCOUNTER
Medication(s) to Refill:   Requested Prescriptions     Pending Prescriptions Disp Refills   • BUPROPION 150 MG Oral Tablet 24 Hr [Pharmacy Med Name: BUPROPION XL 150MG TABLETS (24 H)] 30 tablet 0     Sig: TAKE 1 TABLET(150 MG) BY MOUTH DAILY         Reason

## 2022-01-08 RX ORDER — BUPROPION HYDROCHLORIDE 150 MG/1
TABLET ORAL
Qty: 30 TABLET | Refills: 0 | Status: SHIPPED | OUTPATIENT
Start: 2022-01-08 | End: 2022-02-03

## 2022-02-03 ENCOUNTER — OFFICE VISIT (OUTPATIENT)
Dept: FAMILY MEDICINE CLINIC | Facility: CLINIC | Age: 57
End: 2022-02-03
Payer: MEDICAID

## 2022-02-03 VITALS
DIASTOLIC BLOOD PRESSURE: 68 MMHG | HEIGHT: 71 IN | OXYGEN SATURATION: 97 % | HEART RATE: 64 BPM | RESPIRATION RATE: 16 BRPM | WEIGHT: 223 LBS | TEMPERATURE: 98 F | BODY MASS INDEX: 31.22 KG/M2 | SYSTOLIC BLOOD PRESSURE: 128 MMHG

## 2022-02-03 DIAGNOSIS — R41.3 MEMORY DIFFICULTY: ICD-10-CM

## 2022-02-03 DIAGNOSIS — F33.1 MODERATE EPISODE OF RECURRENT MAJOR DEPRESSIVE DISORDER (HCC): ICD-10-CM

## 2022-02-03 DIAGNOSIS — Z12.11 SCREENING FOR COLON CANCER: ICD-10-CM

## 2022-02-03 DIAGNOSIS — F31.9 BIPOLAR AFFECTIVE DISORDER, REMISSION STATUS UNSPECIFIED (HCC): ICD-10-CM

## 2022-02-03 DIAGNOSIS — F17.210 HEAVY CIGARETTE SMOKER (20-39 PER DAY): ICD-10-CM

## 2022-02-03 DIAGNOSIS — I10 HYPERTENSION, UNCONTROLLED: ICD-10-CM

## 2022-02-03 DIAGNOSIS — N28.89 RENAL MASS: ICD-10-CM

## 2022-02-03 DIAGNOSIS — Z00.00 ENCOUNTER FOR ANNUAL PHYSICAL EXAM: Primary | ICD-10-CM

## 2022-02-03 DIAGNOSIS — E78.00 HYPERCHOLESTEROLEMIA: ICD-10-CM

## 2022-02-03 PROCEDURE — 3008F BODY MASS INDEX DOCD: CPT | Performed by: EMERGENCY MEDICINE

## 2022-02-03 PROCEDURE — 3078F DIAST BP <80 MM HG: CPT | Performed by: EMERGENCY MEDICINE

## 2022-02-03 PROCEDURE — 3074F SYST BP LT 130 MM HG: CPT | Performed by: EMERGENCY MEDICINE

## 2022-02-03 PROCEDURE — 99396 PREV VISIT EST AGE 40-64: CPT | Performed by: EMERGENCY MEDICINE

## 2022-02-03 RX ORDER — BUPROPION HYDROCHLORIDE 150 MG/1
150 TABLET ORAL DAILY
Qty: 90 TABLET | Refills: 1 | Status: SHIPPED | OUTPATIENT
Start: 2022-02-03

## 2022-02-03 RX ORDER — HYDROCHLOROTHIAZIDE 25 MG/1
25 TABLET ORAL EVERY MORNING
Qty: 90 TABLET | Refills: 1 | Status: SHIPPED | OUTPATIENT
Start: 2022-02-03

## 2022-02-03 RX ORDER — AMLODIPINE BESYLATE 5 MG/1
5 TABLET ORAL DAILY
Qty: 90 TABLET | Refills: 1 | Status: SHIPPED | OUTPATIENT
Start: 2022-02-03

## 2022-02-03 RX ORDER — ATORVASTATIN CALCIUM 20 MG/1
20 TABLET, FILM COATED ORAL NIGHTLY
Qty: 90 TABLET | Refills: 1 | Status: SHIPPED | OUTPATIENT
Start: 2022-02-03

## 2022-02-03 RX ORDER — LISINOPRIL 20 MG/1
20 TABLET ORAL DAILY
Qty: 90 TABLET | Refills: 1 | Status: SHIPPED | OUTPATIENT
Start: 2022-02-03

## 2022-04-26 ENCOUNTER — OFFICE VISIT (OUTPATIENT)
Dept: SURGERY | Facility: CLINIC | Age: 57
End: 2022-04-26
Payer: MEDICAID

## 2022-04-26 VITALS — BODY MASS INDEX: 31 KG/M2 | WEIGHT: 223 LBS

## 2022-04-26 DIAGNOSIS — N52.9 ERECTILE DYSFUNCTION, UNSPECIFIED ERECTILE DYSFUNCTION TYPE: ICD-10-CM

## 2022-04-26 DIAGNOSIS — N28.1 RENAL CYST: Primary | ICD-10-CM

## 2022-04-26 PROCEDURE — 99214 OFFICE O/P EST MOD 30 MIN: CPT | Performed by: UROLOGY

## 2022-04-26 RX ORDER — SILDENAFIL 100 MG/1
100 TABLET, FILM COATED ORAL
Qty: 4 TABLET | Refills: 5 | Status: SHIPPED | OUTPATIENT
Start: 2022-04-26

## 2022-06-02 ENCOUNTER — TELEPHONE (OUTPATIENT)
Dept: NEUROLOGY | Facility: CLINIC | Age: 57
End: 2022-06-02

## 2022-06-06 ENCOUNTER — OFFICE VISIT (OUTPATIENT)
Dept: NEUROLOGY | Facility: CLINIC | Age: 57
End: 2022-06-06
Payer: MEDICAID

## 2022-06-06 VITALS
HEART RATE: 62 BPM | SYSTOLIC BLOOD PRESSURE: 128 MMHG | WEIGHT: 222 LBS | RESPIRATION RATE: 16 BRPM | HEIGHT: 71 IN | DIASTOLIC BLOOD PRESSURE: 76 MMHG | BODY MASS INDEX: 31.08 KG/M2

## 2022-06-06 DIAGNOSIS — R41.3 SHORT-TERM MEMORY LOSS: ICD-10-CM

## 2022-06-06 DIAGNOSIS — R41.3 TRANSIENT AMNESIA: Primary | ICD-10-CM

## 2022-06-13 ENCOUNTER — HOSPITAL ENCOUNTER (OUTPATIENT)
Dept: MRI IMAGING | Age: 57
Discharge: HOME | End: 2022-06-13
Attending: Other
Payer: MEDICAID

## 2022-06-13 DIAGNOSIS — R41.3 TRANSIENT AMNESIA: ICD-10-CM

## 2022-06-13 PROCEDURE — 70551 MRI BRAIN STEM W/O DYE: CPT | Performed by: OTHER

## 2022-07-12 ENCOUNTER — PROCEDURE VISIT (OUTPATIENT)
Dept: NEUROLOGY | Facility: CLINIC | Age: 57
End: 2022-07-12

## 2022-07-12 ENCOUNTER — NURSE ONLY (OUTPATIENT)
Dept: ELECTROPHYSIOLOGY | Facility: HOSPITAL | Age: 57
End: 2022-07-12
Attending: Other
Payer: MEDICAID

## 2022-07-12 DIAGNOSIS — R41.3 TRANSIENT AMNESIA: Primary | ICD-10-CM

## 2022-07-12 DIAGNOSIS — R41.3 TRANSIENT AMNESIA: ICD-10-CM

## 2022-07-12 PROCEDURE — 95819 EEG AWAKE AND ASLEEP: CPT | Performed by: OTHER

## 2022-07-12 PROCEDURE — 95819 EEG AWAKE AND ASLEEP: CPT

## 2022-09-01 DIAGNOSIS — I10 HYPERTENSION, UNCONTROLLED: ICD-10-CM

## 2022-09-06 RX ORDER — LISINOPRIL 20 MG/1
20 TABLET ORAL DAILY
Qty: 30 TABLET | Refills: 0 | Status: SHIPPED | OUTPATIENT
Start: 2022-09-06 | End: 2022-10-03

## 2022-09-06 RX ORDER — HYDROCHLOROTHIAZIDE 25 MG/1
25 TABLET ORAL EVERY MORNING
Qty: 30 TABLET | Refills: 0 | Status: SHIPPED | OUTPATIENT
Start: 2022-09-06 | End: 2022-10-03

## 2022-09-06 NOTE — TELEPHONE ENCOUNTER
Lisinopril and hydrochlorothiazide refilled  Needs office visit before next refill, 30 d supply only

## 2022-10-03 DIAGNOSIS — I10 HYPERTENSION, UNCONTROLLED: ICD-10-CM

## 2022-10-03 RX ORDER — LISINOPRIL 20 MG/1
TABLET ORAL
Qty: 15 TABLET | Refills: 0 | Status: SHIPPED | OUTPATIENT
Start: 2022-10-03

## 2022-10-03 RX ORDER — HYDROCHLOROTHIAZIDE 25 MG/1
TABLET ORAL
Qty: 15 TABLET | Refills: 0 | Status: SHIPPED | OUTPATIENT
Start: 2022-10-03

## 2022-10-03 NOTE — TELEPHONE ENCOUNTER
See pt mssge 9/7/22 was to schedule OV with Dr. Tom Nicolas    Pt never read the [facility] message. Called pt and left msg that will refill for 15 tabs each but that he will need to make appt for further additional refills.

## 2022-11-01 ENCOUNTER — OFFICE VISIT (OUTPATIENT)
Dept: FAMILY MEDICINE CLINIC | Facility: CLINIC | Age: 57
End: 2022-11-01
Payer: MEDICAID

## 2022-11-01 VITALS
WEIGHT: 221 LBS | HEART RATE: 60 BPM | HEIGHT: 71 IN | RESPIRATION RATE: 16 BRPM | BODY MASS INDEX: 30.94 KG/M2 | OXYGEN SATURATION: 98 % | SYSTOLIC BLOOD PRESSURE: 124 MMHG | DIASTOLIC BLOOD PRESSURE: 70 MMHG

## 2022-11-01 DIAGNOSIS — E78.00 HYPERCHOLESTEROLEMIA: ICD-10-CM

## 2022-11-01 DIAGNOSIS — I10 HYPERTENSION, UNCONTROLLED: ICD-10-CM

## 2022-11-01 DIAGNOSIS — H53.9 VISION CHANGES: Primary | ICD-10-CM

## 2022-11-01 PROCEDURE — 3008F BODY MASS INDEX DOCD: CPT | Performed by: NURSE PRACTITIONER

## 2022-11-01 PROCEDURE — 3074F SYST BP LT 130 MM HG: CPT | Performed by: NURSE PRACTITIONER

## 2022-11-01 PROCEDURE — 3078F DIAST BP <80 MM HG: CPT | Performed by: NURSE PRACTITIONER

## 2022-11-01 PROCEDURE — 99214 OFFICE O/P EST MOD 30 MIN: CPT | Performed by: NURSE PRACTITIONER

## 2022-11-01 RX ORDER — HYDROCHLOROTHIAZIDE 25 MG/1
25 TABLET ORAL EVERY MORNING
Qty: 90 TABLET | Refills: 0 | Status: SHIPPED | OUTPATIENT
Start: 2022-11-01

## 2022-11-01 RX ORDER — ATORVASTATIN CALCIUM 20 MG/1
20 TABLET, FILM COATED ORAL NIGHTLY
Qty: 90 TABLET | Refills: 1 | Status: SHIPPED | OUTPATIENT
Start: 2022-11-01

## 2022-11-01 RX ORDER — AMLODIPINE BESYLATE 5 MG/1
5 TABLET ORAL DAILY
Qty: 90 TABLET | Refills: 1 | Status: SHIPPED | OUTPATIENT
Start: 2022-11-01

## 2022-11-01 RX ORDER — LISINOPRIL 20 MG/1
20 TABLET ORAL DAILY
Qty: 90 TABLET | Refills: 0 | Status: SHIPPED | OUTPATIENT
Start: 2022-11-01

## 2022-11-01 NOTE — PROGRESS NOTES
Sarai   I, as the nurse practitioner, have personally performed the services documented here and agree that the documentation accurately represents the services and the medical decisions I made. I have reviewed the documentation of the Nursing notes , Review of Systems, Past Medical History, Past Surgical History, Family History and Social History and made changes or additions as needed.

## 2023-02-01 DIAGNOSIS — I10 HYPERTENSION, UNCONTROLLED: ICD-10-CM

## 2023-02-05 RX ORDER — HYDROCHLOROTHIAZIDE 25 MG/1
25 TABLET ORAL EVERY MORNING
Qty: 90 TABLET | Refills: 0 | Status: SHIPPED | OUTPATIENT
Start: 2023-02-05

## 2023-02-05 RX ORDER — LISINOPRIL 20 MG/1
20 TABLET ORAL EVERY MORNING
Qty: 90 TABLET | Refills: 0 | Status: SHIPPED | OUTPATIENT
Start: 2023-02-05

## 2023-05-12 DIAGNOSIS — I10 HYPERTENSION, UNCONTROLLED: ICD-10-CM

## 2023-05-12 RX ORDER — LISINOPRIL 20 MG/1
TABLET ORAL
Qty: 90 TABLET | Refills: 0 | OUTPATIENT
Start: 2023-05-12

## 2023-05-12 RX ORDER — HYDROCHLOROTHIAZIDE 25 MG/1
TABLET ORAL
Qty: 90 TABLET | Refills: 0 | OUTPATIENT
Start: 2023-05-12

## 2023-05-12 NOTE — TELEPHONE ENCOUNTER
Denied. Pt is overdue for visit. PSR tried to contact pt on 2/6/23.     PSR: pls try to contact or mychart pt again to schedule annual physical.

## 2023-06-28 DIAGNOSIS — I10 HYPERTENSION, UNCONTROLLED: ICD-10-CM

## 2023-06-28 DIAGNOSIS — E78.00 HYPERCHOLESTEROLEMIA: ICD-10-CM

## 2023-07-02 RX ORDER — HYDROCHLOROTHIAZIDE 25 MG/1
25 TABLET ORAL EVERY MORNING
Qty: 16 TABLET | Refills: 0 | Status: SHIPPED | OUTPATIENT
Start: 2023-07-02

## 2023-07-02 RX ORDER — LISINOPRIL 20 MG/1
20 TABLET ORAL EVERY MORNING
Qty: 16 TABLET | Refills: 0 | Status: SHIPPED | OUTPATIENT
Start: 2023-07-02

## 2023-07-02 RX ORDER — ATORVASTATIN CALCIUM 20 MG/1
20 TABLET, FILM COATED ORAL NIGHTLY
Qty: 16 TABLET | Refills: 0 | Status: SHIPPED | OUTPATIENT
Start: 2023-07-02

## 2023-07-03 ENCOUNTER — TELEPHONE (OUTPATIENT)
Dept: FAMILY MEDICINE CLINIC | Facility: CLINIC | Age: 58
End: 2023-07-03

## (undated) NOTE — ED AVS SNAPSHOT
Alda Hawkins   MRN: [id number]    Department:  [facility] Emergency Department in [place]   Date of Visit:  2/1/2018           Disclosure     Insurance plans vary and the physician(s) referred by the ER may not be covered by your plan.  Please contact tell this physician (or your personal doctor if your instructions are to return to your personal doctor) about any new or lasting problems. The primary care or specialist physician will see patients referred from the [facility] Emergency Department.  Fredi Dumont

## (undated) NOTE — LETTER
12/20/17        [address]      Dear Amber Dunn records indicate that you have outstanding lab work and or testing that was ordered for you and has not yet been completed:          CBC W Differential W Plate